# Patient Record
Sex: FEMALE | Race: ASIAN | URBAN - METROPOLITAN AREA
[De-identification: names, ages, dates, MRNs, and addresses within clinical notes are randomized per-mention and may not be internally consistent; named-entity substitution may affect disease eponyms.]

---

## 2022-10-20 ENCOUNTER — ULTRASOUND ENCOUNTER (OUTPATIENT)
Dept: OBGYN CLINIC | Facility: CLINIC | Age: 32
End: 2022-10-20
Payer: COMMERCIAL

## 2022-10-20 ENCOUNTER — OFFICE VISIT (OUTPATIENT)
Dept: OBGYN CLINIC | Facility: CLINIC | Age: 32
End: 2022-10-20
Payer: COMMERCIAL

## 2022-10-20 VITALS
DIASTOLIC BLOOD PRESSURE: 60 MMHG | SYSTOLIC BLOOD PRESSURE: 108 MMHG | BODY MASS INDEX: 20.85 KG/M2 | WEIGHT: 123.63 LBS | HEIGHT: 64.5 IN | HEART RATE: 71 BPM

## 2022-10-20 DIAGNOSIS — B19.10 HEPATITIS B AFFECTING PREGNANCY: ICD-10-CM

## 2022-10-20 DIAGNOSIS — O98.419 HEPATITIS B AFFECTING PREGNANCY: ICD-10-CM

## 2022-10-20 DIAGNOSIS — Z32.01 PREGNANCY EXAMINATION OR TEST, POSITIVE RESULT: ICD-10-CM

## 2022-10-20 DIAGNOSIS — Z23 NEED FOR VACCINATION: ICD-10-CM

## 2022-10-20 DIAGNOSIS — N91.1 SECONDARY AMENORRHEA: Primary | ICD-10-CM

## 2022-10-20 PROBLEM — Z98.891 HISTORY OF CESAREAN DELIVERY: Status: ACTIVE | Noted: 2022-10-20

## 2022-10-20 PROCEDURE — 99203 OFFICE O/P NEW LOW 30 MIN: CPT | Performed by: OBSTETRICS & GYNECOLOGY

## 2022-10-20 PROCEDURE — 3008F BODY MASS INDEX DOCD: CPT | Performed by: OBSTETRICS & GYNECOLOGY

## 2022-10-20 PROCEDURE — 3078F DIAST BP <80 MM HG: CPT | Performed by: OBSTETRICS & GYNECOLOGY

## 2022-10-20 PROCEDURE — 90471 IMMUNIZATION ADMIN: CPT | Performed by: OBSTETRICS & GYNECOLOGY

## 2022-10-20 PROCEDURE — 90686 IIV4 VACC NO PRSV 0.5 ML IM: CPT | Performed by: OBSTETRICS & GYNECOLOGY

## 2022-10-20 PROCEDURE — 76830 TRANSVAGINAL US NON-OB: CPT | Performed by: OBSTETRICS & GYNECOLOGY

## 2022-10-20 PROCEDURE — 3074F SYST BP LT 130 MM HG: CPT | Performed by: OBSTETRICS & GYNECOLOGY

## 2022-10-20 RX ORDER — MULTIVIT,IRON,MINERALS/LUTEIN
TABLET ORAL
COMMUNITY

## 2022-10-31 ENCOUNTER — TELEPHONE (OUTPATIENT)
Dept: OBGYN CLINIC | Facility: CLINIC | Age: 32
End: 2022-10-31

## 2022-10-31 DIAGNOSIS — Z13.228 SCREENING FOR CYSTIC FIBROSIS: ICD-10-CM

## 2022-10-31 DIAGNOSIS — Z36.8A ENCOUNTER FOR ANTENATAL SCREENING FOR OTHER GENETIC DEFECT: Primary | ICD-10-CM

## 2022-10-31 NOTE — TELEPHONE ENCOUNTER
Patient calling and states would like [id number], Alpha Fetoprotein, and Cystic Fibrosis/SMA Carrier screening. Orders placed; patient is aware AFP should be done around 16-20 weeks. Patient would like to  MaterniT 21 order at the [place] office.

## 2022-11-10 ENCOUNTER — LAB ENCOUNTER (OUTPATIENT)
Dept: LAB | Age: 32
End: 2022-11-10
Attending: OBSTETRICS & GYNECOLOGY
Payer: COMMERCIAL

## 2022-11-10 DIAGNOSIS — Z36.8A ENCOUNTER FOR ANTENATAL SCREENING FOR OTHER GENETIC DEFECT: Primary | ICD-10-CM

## 2022-11-14 ENCOUNTER — TELEPHONE (OUTPATIENT)
Dept: OBGYN CLINIC | Facility: CLINIC | Age: 32
End: 2022-11-14

## 2022-11-14 ENCOUNTER — LAB ENCOUNTER (OUTPATIENT)
Dept: LAB | Age: 32
End: 2022-11-14
Attending: NURSE PRACTITIONER
Payer: COMMERCIAL

## 2022-11-14 ENCOUNTER — INITIAL PRENATAL (OUTPATIENT)
Dept: OBGYN CLINIC | Facility: CLINIC | Age: 32
End: 2022-11-14
Payer: COMMERCIAL

## 2022-11-14 VITALS
WEIGHT: 123.38 LBS | DIASTOLIC BLOOD PRESSURE: 60 MMHG | HEIGHT: 64 IN | BODY MASS INDEX: 21.06 KG/M2 | SYSTOLIC BLOOD PRESSURE: 110 MMHG

## 2022-11-14 DIAGNOSIS — Z34.80 PRENATAL CARE OF MULTIGRAVIDA, ANTEPARTUM: ICD-10-CM

## 2022-11-14 DIAGNOSIS — B19.10 HEPATITIS B AFFECTING PREGNANCY: ICD-10-CM

## 2022-11-14 DIAGNOSIS — Z98.891 HISTORY OF CESAREAN DELIVERY: ICD-10-CM

## 2022-11-14 DIAGNOSIS — O98.419 HEPATITIS B AFFECTING PREGNANCY: ICD-10-CM

## 2022-11-14 DIAGNOSIS — Z12.4 CERVICAL CANCER SCREENING: ICD-10-CM

## 2022-11-14 DIAGNOSIS — Z36.8A ENCOUNTER FOR ANTENATAL SCREENING FOR OTHER GENETIC DEFECT: ICD-10-CM

## 2022-11-14 DIAGNOSIS — Z13.228 SCREENING FOR CYSTIC FIBROSIS: ICD-10-CM

## 2022-11-14 DIAGNOSIS — Z34.80 PRENATAL CARE OF MULTIGRAVIDA, ANTEPARTUM: Primary | ICD-10-CM

## 2022-11-14 LAB
ANTIBODY SCREEN: NEGATIVE
BASOPHILS # BLD AUTO: 0.03 X10(3) UL (ref 0–0.2)
BASOPHILS NFR BLD AUTO: 0.4 %
EOSINOPHIL # BLD AUTO: 0.09 X10(3) UL (ref 0–0.7)
EOSINOPHIL NFR BLD AUTO: 1.2 %
ERYTHROCYTE [DISTWIDTH] IN BLOOD BY AUTOMATED COUNT: 12.3 %
GLUCOSE (URINE DIPSTICK): 100 MG/DL
HBV SURFACE AG SER-ACNC: >1000 [IU]/L
HBV SURFACE AG SERPL QL IA: REACTIVE
HCT VFR BLD AUTO: 39.2 %
HCV AB SERPL QL IA: NONREACTIVE
HGB BLD-MCNC: 13.3 G/DL
IMM GRANULOCYTES # BLD AUTO: 0.02 X10(3) UL (ref 0–1)
IMM GRANULOCYTES NFR BLD: 0.3 %
LYMPHOCYTES # BLD AUTO: 1.98 X10(3) UL (ref 1–4)
LYMPHOCYTES NFR BLD AUTO: 25.8 %
MCH RBC QN AUTO: 31.1 PG (ref 26–34)
MCHC RBC AUTO-ENTMCNC: 33.9 G/DL (ref 31–37)
MCV RBC AUTO: 91.8 FL
MONOCYTES # BLD AUTO: 0.49 X10(3) UL (ref 0.1–1)
MONOCYTES NFR BLD AUTO: 6.4 %
MULTISTIX LOT#: ABNORMAL NUMERIC
NEUTROPHILS # BLD AUTO: 5.06 X10 (3) UL (ref 1.5–7.7)
NEUTROPHILS # BLD AUTO: 5.06 X10(3) UL (ref 1.5–7.7)
NEUTROPHILS NFR BLD AUTO: 65.9 %
PLATELET # BLD AUTO: 218 10(3)UL (ref 150–450)
RBC # BLD AUTO: 4.27 X10(6)UL
RH BLOOD TYPE: POSITIVE
RUBV IGG SER QL: POSITIVE
RUBV IGG SER-ACNC: 163.6 IU/ML (ref 10–?)
T PALLIDUM AB SER QL IA: NONREACTIVE
TSI SER-ACNC: 1.71 MIU/ML (ref 0.36–3.74)
WBC # BLD AUTO: 7.7 X10(3) UL (ref 4–11)

## 2022-11-14 PROCEDURE — 86901 BLOOD TYPING SEROLOGIC RH(D): CPT | Performed by: NURSE PRACTITIONER

## 2022-11-14 PROCEDURE — 87389 HIV-1 AG W/HIV-1&-2 AB AG IA: CPT | Performed by: NURSE PRACTITIONER

## 2022-11-14 PROCEDURE — 87340 HEPATITIS B SURFACE AG IA: CPT | Performed by: NURSE PRACTITIONER

## 2022-11-14 PROCEDURE — 80050 GENERAL HEALTH PANEL: CPT | Performed by: NURSE PRACTITIONER

## 2022-11-14 PROCEDURE — 86762 RUBELLA ANTIBODY: CPT | Performed by: NURSE PRACTITIONER

## 2022-11-14 PROCEDURE — 3008F BODY MASS INDEX DOCD: CPT | Performed by: NURSE PRACTITIONER

## 2022-11-14 PROCEDURE — 86803 HEPATITIS C AB TEST: CPT | Performed by: NURSE PRACTITIONER

## 2022-11-14 PROCEDURE — 86850 RBC ANTIBODY SCREEN: CPT | Performed by: NURSE PRACTITIONER

## 2022-11-14 PROCEDURE — 86900 BLOOD TYPING SEROLOGIC ABO: CPT | Performed by: NURSE PRACTITIONER

## 2022-11-14 PROCEDURE — 87591 N.GONORRHOEAE DNA AMP PROB: CPT | Performed by: NURSE PRACTITIONER

## 2022-11-14 PROCEDURE — 81002 URINALYSIS NONAUTO W/O SCOPE: CPT | Performed by: NURSE PRACTITIONER

## 2022-11-14 PROCEDURE — 81220 CFTR GENE COM VARIANTS: CPT | Performed by: OBSTETRICS & GYNECOLOGY

## 2022-11-14 PROCEDURE — 86780 TREPONEMA PALLIDUM: CPT | Performed by: NURSE PRACTITIONER

## 2022-11-14 PROCEDURE — 87086 URINE CULTURE/COLONY COUNT: CPT | Performed by: NURSE PRACTITIONER

## 2022-11-14 PROCEDURE — 81329 SMN1 GENE DOS/DELETION ALYS: CPT | Performed by: OBSTETRICS & GYNECOLOGY

## 2022-11-14 PROCEDURE — 87491 CHLMYD TRACH DNA AMP PROBE: CPT | Performed by: NURSE PRACTITIONER

## 2022-11-14 PROCEDURE — 3078F DIAST BP <80 MM HG: CPT | Performed by: NURSE PRACTITIONER

## 2022-11-14 PROCEDURE — 3074F SYST BP LT 130 MM HG: CPT | Performed by: NURSE PRACTITIONER

## 2022-11-14 PROCEDURE — 87624 HPV HI-RISK TYP POOLED RSLT: CPT | Performed by: NURSE PRACTITIONER

## 2022-11-14 RX ORDER — PRENATAL VIT 91/IRON/FOLIC/DHA 28-975-200
COMBINATION PACKAGE (EA) ORAL
Qty: 180 EACH | Refills: 2 | Status: SHIPPED | OUTPATIENT
Start: 2022-11-14

## 2022-11-14 NOTE — PROGRESS NOTES
Here for initial prenatal visit with our group. 29year old  at 11w4d by LMP. Patient's last menstrual period was 2022. Last pap smear was in 2019 and it was normal.    She had a primary C/S for arrest of descent. She prefers a repeat at this time. She was born with Hepatitis B, has not needed treatment in around 5 years. OB History    Para Term  AB Living   2 1 1   0 1   SAB IAB Ectopic Multiple Live Births   0       1      # Outcome Date GA Lbr Miguel/2nd Weight Sex Delivery Anes PTL Lv   2 Current            1 Term 20 40w0d   F Caesarean   MORENO     ROS:  Reviewed, all wnl    Please see prenatal physical exam tab for initial OB physical exam  US: please see US tab    Prenatal course and care discussed with patient: visits, labs, HIV, AFP, sonograms, GL, GBS, restrictions. Pamphlets given. Genetic counseling done at prior visit regarding Cystic fibrosis and SMA carrier screening, First trimester screen/NT screen, CVS, QS + midtrimester sonogram for markers, amniocentesis. Pt completed the MaterniT 21 last week. She plan to do SMA and CF carrier screen with her new OB labs. A/P:  1. Prenatal care of multigravida, antepartum  - URINALYSIS NONAUTO W/O SCOPE (OB URISTIX)  - CHLAMYDIA/GONOCOCCUS, KATHRINE; Future  - PRENATAL SCREEN; Future  - CBC WITH DIFFERENTIAL WITH PLATELET; Future  - HEPATITIS B SURFACE ANTIGEN; Future  - T PALLIDUM SCREENING CASCADE; Future  - RUBELLA, IGG; Future  - HIV AG AB COMBO; Future  - URINE CULTURE, ROUTINE; Future  - HCV ANTIBODY; Future  - Prenatal MV-Min-Fe Fum-FA-DHA (PRENATAL+DHA) 28-0.975 & 200 MG Oral Misc; 1 tablet and 1 capsule PO QD  Dispense: 180 each; Refill: 2  - CHLAMYDIA/GONOCOCCUS, KATHRINE  - URINE CULTURE, ROUTINE    2. Cervical cancer screening  - THINPREP PAP WITH HPV REFLEX REQUEST B; Future  - THINPREP PAP WITH HPV REFLEX REQUEST B    3. Hepatitis B affecting pregnancy  - OP REFERRAL TO  CONSULT- Level 2 US    4. History of  delivery  - OP REFERRAL TO  CONSULT  Considering RCS    ANTWAN 4 weeks/ prn

## 2022-11-15 ENCOUNTER — TELEPHONE (OUTPATIENT)
Dept: OBGYN CLINIC | Facility: CLINIC | Age: 32
End: 2022-11-15

## 2022-11-15 LAB
ALBUMIN SERPL-MCNC: 3.6 G/DL (ref 3.4–5)
ALBUMIN/GLOB SERPL: 1.1 {RATIO} (ref 1–2)
ALP LIVER SERPL-CCNC: 37 U/L
ALT SERPL-CCNC: 16 U/L
ANION GAP SERPL CALC-SCNC: 6 MMOL/L (ref 0–18)
AST SERPL-CCNC: 6 U/L (ref 15–37)
BILIRUB SERPL-MCNC: 0.2 MG/DL (ref 0.1–2)
BUN BLD-MCNC: 8 MG/DL (ref 7–18)
C TRACH DNA SPEC QL NAA+PROBE: NEGATIVE
CALCIUM BLD-MCNC: 8.8 MG/DL (ref 8.5–10.1)
CHLORIDE SERPL-SCNC: 107 MMOL/L (ref 98–112)
CO2 SERPL-SCNC: 23 MMOL/L (ref 21–32)
CREAT BLD-MCNC: 0.5 MG/DL
GFR SERPLBLD BASED ON 1.73 SQ M-ARVRAT: 129 ML/MIN/1.73M2 (ref 60–?)
GLOBULIN PLAS-MCNC: 3.3 G/DL (ref 2.8–4.4)
GLUCOSE BLD-MCNC: 85 MG/DL (ref 70–99)
N GONORRHOEA DNA SPEC QL NAA+PROBE: NEGATIVE
OSMOLALITY SERPL CALC.SUM OF ELEC: 280 MOSM/KG (ref 275–295)
POTASSIUM SERPL-SCNC: 4.3 MMOL/L (ref 3.5–5.1)
PROT SERPL-MCNC: 6.9 G/DL (ref 6.4–8.2)
SODIUM SERPL-SCNC: 136 MMOL/L (ref 136–145)

## 2022-11-15 NOTE — TELEPHONE ENCOUNTER
Pharmacy calling. The prenatal that was sent in is manufactured by [employer] and therefore not available at [facility]. They do have an alternative available with the same ingredients and almost the same dosages. OK to substitute the brand they have in stock.

## 2022-11-20 LAB
CYSTIC FIBROSIS ALLELE 1: NEGATIVE
CYSTIC FIBROSIS, ALLELE 2: NEGATIVE
SMA COPY NUMBER, SYMPTOMS: NO

## 2022-11-21 LAB — HPV I/H RISK 1 DNA SPEC QL NAA+PROBE: NEGATIVE

## 2022-11-29 ENCOUNTER — TELEPHONE (OUTPATIENT)
Dept: OBGYN CLINIC | Facility: CLINIC | Age: 32
End: 2022-11-29

## 2022-11-29 NOTE — TELEPHONE ENCOUNTER
Patient would like to speak with a nurse to go over [id number] test results.  Please give patient a call

## 2022-12-01 ENCOUNTER — LAB ENCOUNTER (OUTPATIENT)
Dept: LAB | Age: 32
End: 2022-12-01
Attending: OBSTETRICS & GYNECOLOGY
Payer: COMMERCIAL

## 2022-12-01 DIAGNOSIS — B18.1 HEPATITIS B CARRIER (HCC): Primary | ICD-10-CM

## 2022-12-01 LAB
ALBUMIN SERPL-MCNC: 3.4 G/DL (ref 3.4–5)
ALBUMIN/GLOB SERPL: 1.1 {RATIO} (ref 1–2)
ALP LIVER SERPL-CCNC: 39 U/L
ALT SERPL-CCNC: 16 U/L
ANION GAP SERPL CALC-SCNC: 6 MMOL/L (ref 0–18)
AST SERPL-CCNC: 10 U/L (ref 15–37)
BILIRUB SERPL-MCNC: 0.3 MG/DL (ref 0.1–2)
BUN BLD-MCNC: 9 MG/DL (ref 7–18)
BUN/CREAT SERPL: 17 (ref 10–20)
CALCIUM BLD-MCNC: 9.3 MG/DL (ref 8.5–10.1)
CHLORIDE SERPL-SCNC: 105 MMOL/L (ref 98–112)
CO2 SERPL-SCNC: 26 MMOL/L (ref 21–32)
CREAT BLD-MCNC: 0.53 MG/DL
FASTING STATUS PATIENT QL REPORTED: NO
GFR SERPLBLD BASED ON 1.73 SQ M-ARVRAT: 128 ML/MIN/1.73M2 (ref 60–?)
GLOBULIN PLAS-MCNC: 3.1 G/DL (ref 2.8–4.4)
GLUCOSE BLD-MCNC: 128 MG/DL (ref 70–99)
OSMOLALITY SERPL CALC.SUM OF ELEC: 284 MOSM/KG (ref 275–295)
POTASSIUM SERPL-SCNC: 3.7 MMOL/L (ref 3.5–5.1)
PROT SERPL-MCNC: 6.5 G/DL (ref 6.4–8.2)
SODIUM SERPL-SCNC: 137 MMOL/L (ref 136–145)

## 2022-12-01 PROCEDURE — 80053 COMPREHEN METABOLIC PANEL: CPT

## 2022-12-01 PROCEDURE — 87517 HEPATITIS B DNA QUANT: CPT

## 2022-12-07 LAB
HBV QNT BY NAAT (IU/ML): 180 IU/ML
HBV QNT BY NAAT (LOG IU/ML): 2.26 LOG IU/ML
HBV QNT BY NAAT INTERP: DETECTED

## 2022-12-13 ENCOUNTER — ROUTINE PRENATAL (OUTPATIENT)
Dept: OBGYN CLINIC | Facility: CLINIC | Age: 32
End: 2022-12-13
Payer: COMMERCIAL

## 2022-12-13 VITALS
BODY MASS INDEX: 21.4 KG/M2 | WEIGHT: 125.38 LBS | HEIGHT: 64 IN | SYSTOLIC BLOOD PRESSURE: 100 MMHG | DIASTOLIC BLOOD PRESSURE: 60 MMHG

## 2022-12-13 DIAGNOSIS — O98.419 HEPATITIS B AFFECTING PREGNANCY: ICD-10-CM

## 2022-12-13 DIAGNOSIS — Z34.80 PRENATAL CARE OF MULTIGRAVIDA, ANTEPARTUM: ICD-10-CM

## 2022-12-13 DIAGNOSIS — Z34.80 SUPERVISION OF OTHER NORMAL PREGNANCY, ANTEPARTUM: Primary | ICD-10-CM

## 2022-12-13 DIAGNOSIS — B19.10 HEPATITIS B AFFECTING PREGNANCY: ICD-10-CM

## 2022-12-13 DIAGNOSIS — Z98.891 HISTORY OF CESAREAN DELIVERY: ICD-10-CM

## 2022-12-13 LAB
GLUCOSE (URINE DIPSTICK): 250 MG/DL
MULTISTIX LOT#: ABNORMAL NUMERIC

## 2022-12-13 PROCEDURE — 81002 URINALYSIS NONAUTO W/O SCOPE: CPT | Performed by: OBSTETRICS & GYNECOLOGY

## 2022-12-13 PROCEDURE — 3078F DIAST BP <80 MM HG: CPT | Performed by: OBSTETRICS & GYNECOLOGY

## 2022-12-13 PROCEDURE — 3074F SYST BP LT 130 MM HG: CPT | Performed by: OBSTETRICS & GYNECOLOGY

## 2022-12-13 PROCEDURE — 3008F BODY MASS INDEX DOCD: CPT | Performed by: OBSTETRICS & GYNECOLOGY

## 2022-12-13 NOTE — PROGRESS NOTES
ANTWAN    GA 15w5d   22  1158   BP: 100/60   Weight: 125 lb 6.4 oz (56.9 kg)   Height: 64\"       Doing well. No complaints. Denies abdominal/pelvic pain or vaginal bleeding. Rh +   Genetic screening NIPT neg. Plans to complete MSAFP. Recommend Anatomy scan 20 wks by level 2 US  Prenatal labs reviewed   Glucosuria, patient had just eaten pizza before arrival. Recommend increase water. Recheck at next visit. May consider early 1 hour glucose tolerance screening if persistent. Discussed with patient. Patient reports coughing for about 1 minute when she enters the cold environment. Possibly occurred outside of pregnancy. Recommend to follow-up with PCP regarding possible bronchospasm. Patient also reports nailbed changes purple when exposed to cold temperature. Discussed with patient possible perfusion issue and not likely related to pregnancy. Recommend to follow-up with PCP. Problem List Items Addressed This Visit        Gastrointestinal and Abdominal    Hepatitis B affecting pregnancy       Gravid and     History of  delivery    Supervision of other normal pregnancy, antepartum - Primary   Other Visit Diagnoses     Prenatal care of multigravida, antepartum                RTC in 4 weeks           Note to patient and family   The [facility] makes medical notes available to patients in the interest of transparency. However, please be advised that this is a medical document. It is intended as ysth-bf-hbjm communication. It is written and medical language may contain abbreviations or verbiage that are technical and unfamiliar. It may appear blunt or direct. Medical documents are intended to carry relevant information, facts as evident, and the clinical opinion of the practitioner.

## 2022-12-14 ENCOUNTER — HOSPITAL ENCOUNTER (OUTPATIENT)
Dept: ULTRASOUND IMAGING | Age: 32
Discharge: HOME | End: 2022-12-14
Attending: INTERNAL MEDICINE
Payer: COMMERCIAL

## 2022-12-14 DIAGNOSIS — B18.1 HEPATITIS B CARRIER (HCC): ICD-10-CM

## 2022-12-14 PROCEDURE — 76705 ECHO EXAM OF ABDOMEN: CPT | Performed by: INTERNAL MEDICINE

## 2022-12-19 ENCOUNTER — LAB ENCOUNTER (OUTPATIENT)
Dept: LAB | Age: 32
End: 2022-12-19
Attending: OBSTETRICS & GYNECOLOGY
Payer: COMMERCIAL

## 2022-12-19 DIAGNOSIS — Z36.8A ENCOUNTER FOR ANTENATAL SCREENING FOR OTHER GENETIC DEFECT: ICD-10-CM

## 2022-12-19 PROCEDURE — 82105 ALPHA-FETOPROTEIN SERUM: CPT | Performed by: OBSTETRICS & GYNECOLOGY

## 2022-12-22 LAB
AFP SMOKING: NO
FAMILY HX NEURAL TUBE DEFECT: NO
INSULIN REQ MATERNAL DIABETES: NO
MATERNAL AGE OF DELIVERY: 31.3 YR
MOM FOR AFP: 1.11
PATIENT'S AFP: 47 NG/ML

## 2023-01-06 PROBLEM — E22.1 HYPERPROLACTINEMIA (HCC): Status: ACTIVE | Noted: 2019-05-08

## 2023-01-06 PROBLEM — B18.1: Status: ACTIVE | Noted: 2020-01-08

## 2023-01-09 ENCOUNTER — ROUTINE PRENATAL (OUTPATIENT)
Dept: OBGYN CLINIC | Facility: CLINIC | Age: 33
End: 2023-01-09
Payer: COMMERCIAL

## 2023-01-09 VITALS
WEIGHT: 126.19 LBS | SYSTOLIC BLOOD PRESSURE: 100 MMHG | HEIGHT: 64 IN | BODY MASS INDEX: 21.54 KG/M2 | DIASTOLIC BLOOD PRESSURE: 54 MMHG

## 2023-01-09 DIAGNOSIS — Z34.80 PRENATAL CARE OF MULTIGRAVIDA, ANTEPARTUM: Primary | ICD-10-CM

## 2023-01-09 LAB
GLUCOSE (URINE DIPSTICK): NEGATIVE MG/DL
MULTISTIX LOT#: NORMAL NUMERIC

## 2023-01-09 PROCEDURE — 81002 URINALYSIS NONAUTO W/O SCOPE: CPT | Performed by: OBSTETRICS & GYNECOLOGY

## 2023-01-09 PROCEDURE — 3074F SYST BP LT 130 MM HG: CPT | Performed by: OBSTETRICS & GYNECOLOGY

## 2023-01-09 PROCEDURE — 3078F DIAST BP <80 MM HG: CPT | Performed by: OBSTETRICS & GYNECOLOGY

## 2023-01-09 PROCEDURE — 3008F BODY MASS INDEX DOCD: CPT | Performed by: OBSTETRICS & GYNECOLOGY

## 2023-01-09 NOTE — PROGRESS NOTES
Patient presents with:  Prenatal Care: Quita Bartlett - wants to discuss c section scheduling. Wants to know what cough medications are safe during pregnancy. Routine prenatal visit. Patient without complaints. Good fetal movement. Patient denies any bleeding, leaking fluid, cramping, or contractions. Assessment/Plan:  19w4d doing well  Previous CS- plan RCS 39 wks. Sent request next visit. 1 hr GTT and CBC after next visit. Order not placed. Blood type A positive. Screening for fetal malformations- Level 2 scheduled for 1/16/23. Patient had NIPT, carrier and AFP. Diagnoses and all orders for this visit:    Prenatal care of multigravida, antepartum  -     URINALYSIS NONAUTO W/O SCOPE (OB URISTIX)      Return in about 4 weeks (around 2/6/2023) for Routine Prenatal Visit.

## 2023-01-16 ENCOUNTER — OFFICE VISIT (OUTPATIENT)
Dept: PERINATAL CARE | Facility: HOSPITAL | Age: 33
End: 2023-01-16
Attending: NURSE PRACTITIONER
Payer: COMMERCIAL

## 2023-01-16 ENCOUNTER — ULTRASOUND ENCOUNTER (OUTPATIENT)
Dept: PERINATAL CARE | Facility: HOSPITAL | Age: 33
End: 2023-01-16
Attending: OBSTETRICS & GYNECOLOGY
Payer: COMMERCIAL

## 2023-01-16 VITALS
WEIGHT: 127 LBS | HEIGHT: 64 IN | BODY MASS INDEX: 21.68 KG/M2 | HEART RATE: 98 BPM | SYSTOLIC BLOOD PRESSURE: 111 MMHG | DIASTOLIC BLOOD PRESSURE: 76 MMHG

## 2023-01-16 DIAGNOSIS — O98.419 HEPATITIS B AFFECTING PREGNANCY: ICD-10-CM

## 2023-01-16 DIAGNOSIS — Z98.891 HISTORY OF CESAREAN DELIVERY: ICD-10-CM

## 2023-01-16 DIAGNOSIS — O98.419 HEPATITIS B AFFECTING PREGNANCY: Primary | ICD-10-CM

## 2023-01-16 DIAGNOSIS — B19.10 HEPATITIS B AFFECTING PREGNANCY: Primary | ICD-10-CM

## 2023-01-16 DIAGNOSIS — B18.1 CHRONIC HEPATITIS B WITHOUT DELTA AGENT WITHOUT HEPATIC COMA (HCC): ICD-10-CM

## 2023-01-16 DIAGNOSIS — B19.10 HEPATITIS B AFFECTING PREGNANCY: ICD-10-CM

## 2023-01-16 PROCEDURE — 76811 OB US DETAILED SNGL FETUS: CPT | Performed by: OBSTETRICS & GYNECOLOGY

## 2023-02-03 ENCOUNTER — OFFICE VISIT (OUTPATIENT)
Dept: FAMILY MEDICINE CLINIC | Facility: CLINIC | Age: 33
End: 2023-02-03
Payer: COMMERCIAL

## 2023-02-03 VITALS
HEART RATE: 102 BPM | SYSTOLIC BLOOD PRESSURE: 110 MMHG | WEIGHT: 132 LBS | BODY MASS INDEX: 22.26 KG/M2 | TEMPERATURE: 98 F | OXYGEN SATURATION: 99 % | DIASTOLIC BLOOD PRESSURE: 62 MMHG | RESPIRATION RATE: 18 BRPM | HEIGHT: 64.75 IN

## 2023-02-03 DIAGNOSIS — Z00.00 ANNUAL PHYSICAL EXAM: Primary | ICD-10-CM

## 2023-02-03 DIAGNOSIS — B18.1 CHRONIC HEPATITIS B (HCC): ICD-10-CM

## 2023-02-03 PROCEDURE — 3074F SYST BP LT 130 MM HG: CPT | Performed by: STUDENT IN AN ORGANIZED HEALTH CARE EDUCATION/TRAINING PROGRAM

## 2023-02-03 PROCEDURE — 99385 PREV VISIT NEW AGE 18-39: CPT | Performed by: STUDENT IN AN ORGANIZED HEALTH CARE EDUCATION/TRAINING PROGRAM

## 2023-02-03 PROCEDURE — 3078F DIAST BP <80 MM HG: CPT | Performed by: STUDENT IN AN ORGANIZED HEALTH CARE EDUCATION/TRAINING PROGRAM

## 2023-02-03 PROCEDURE — 3008F BODY MASS INDEX DOCD: CPT | Performed by: STUDENT IN AN ORGANIZED HEALTH CARE EDUCATION/TRAINING PROGRAM

## 2023-02-06 ENCOUNTER — ROUTINE PRENATAL (OUTPATIENT)
Dept: OBGYN CLINIC | Facility: CLINIC | Age: 33
End: 2023-02-06
Payer: COMMERCIAL

## 2023-02-06 VITALS — BODY MASS INDEX: 22 KG/M2 | WEIGHT: 130.81 LBS | SYSTOLIC BLOOD PRESSURE: 104 MMHG | DIASTOLIC BLOOD PRESSURE: 50 MMHG

## 2023-02-06 DIAGNOSIS — N89.8 VAGINAL ITCHING: ICD-10-CM

## 2023-02-06 DIAGNOSIS — Z34.80 PRENATAL CARE OF MULTIGRAVIDA, ANTEPARTUM: ICD-10-CM

## 2023-02-06 LAB
GLUCOSE (URINE DIPSTICK): NEGATIVE MG/DL
MULTISTIX LOT#: NORMAL NUMERIC
PROTEIN (URINE DIPSTICK): NEGATIVE MG/DL

## 2023-02-06 PROCEDURE — 87660 TRICHOMONAS VAGIN DIR PROBE: CPT | Performed by: OBSTETRICS & GYNECOLOGY

## 2023-02-06 PROCEDURE — 87480 CANDIDA DNA DIR PROBE: CPT | Performed by: OBSTETRICS & GYNECOLOGY

## 2023-02-06 PROCEDURE — 87510 GARDNER VAG DNA DIR PROBE: CPT | Performed by: OBSTETRICS & GYNECOLOGY

## 2023-02-06 NOTE — PROGRESS NOTES
ANTWAN 23w4d    Doing ok, having vaginal itching, bought monistat but has not started yet,.white discharge noted, +FM  No contractions  No LOF, VB  Also feels a breast lump on left breast, last 4 days ago at night    Breast exam unremarkable    1. FHT-prsent  2. PNL:  1 hour/CBC ordered and instructions given  3. Mode of delivery: RCS request sent  4. Immunizations: reviewed TDAP  5. Yeast infection suspected, vaginitis colelcted, advised monistat  6.  Breast exam unremarkable, monitor    Return in 4 weeks

## 2023-02-07 ENCOUNTER — TELEPHONE (OUTPATIENT)
Dept: OBGYN CLINIC | Facility: CLINIC | Age: 33
End: 2023-02-07

## 2023-02-07 ENCOUNTER — PATIENT MESSAGE (OUTPATIENT)
Dept: OBGYN CLINIC | Facility: CLINIC | Age: 33
End: 2023-02-07

## 2023-02-07 DIAGNOSIS — Z01.812 ENCOUNTER FOR PREOPERATIVE SCREENING LABORATORY TESTING FOR COVID-19 VIRUS: Primary | ICD-10-CM

## 2023-02-07 DIAGNOSIS — Z20.822 ENCOUNTER FOR PREOPERATIVE SCREENING LABORATORY TESTING FOR COVID-19 VIRUS: Primary | ICD-10-CM

## 2023-02-07 NOTE — TELEPHONE ENCOUNTER
----- Message from Sammy Matthews MD sent at 2/6/2023 10:13 AM CST -----  Schedule RCS at 39 weeks thanks

## 2023-02-07 NOTE — TELEPHONE ENCOUNTER
Patient called and wants pharmacy updated and medication sent to [facility].  Patient also wants to talk to a nurse about a test. Please advise

## 2023-02-07 NOTE — TELEPHONE ENCOUNTER
RCS scheduled for 5/25/23 at 730  Form faxed to L&D  Added to calendar  Forward to RN  Sent [facility] to pt

## 2023-02-09 RX ORDER — CLINDAMYCIN PHOSPHATE 20 MG/G
1 CREAM VAGINAL NIGHTLY
Qty: 40 G | Refills: 0 | Status: SHIPPED | OUTPATIENT
Start: 2023-02-09 | End: 2023-02-16

## 2023-02-14 ENCOUNTER — LAB ENCOUNTER (OUTPATIENT)
Dept: LAB | Age: 33
End: 2023-02-14
Attending: OBSTETRICS & GYNECOLOGY
Payer: COMMERCIAL

## 2023-02-14 ENCOUNTER — PATIENT MESSAGE (OUTPATIENT)
Dept: OBGYN CLINIC | Facility: CLINIC | Age: 33
End: 2023-02-14

## 2023-02-14 DIAGNOSIS — O99.810 ABNORMAL MATERNAL GLUCOSE TOLERANCE, ANTEPARTUM: Primary | ICD-10-CM

## 2023-02-14 DIAGNOSIS — Z34.80 PRENATAL CARE OF MULTIGRAVIDA, ANTEPARTUM: ICD-10-CM

## 2023-02-14 LAB
BASOPHILS # BLD AUTO: 0.03 X10(3) UL (ref 0–0.2)
BASOPHILS NFR BLD AUTO: 0.4 %
EOSINOPHIL # BLD AUTO: 0.03 X10(3) UL (ref 0–0.7)
EOSINOPHIL NFR BLD AUTO: 0.4 %
ERYTHROCYTE [DISTWIDTH] IN BLOOD BY AUTOMATED COUNT: 12.8 %
GLUCOSE 1H P GLC SERPL-MCNC: 162 MG/DL
HCT VFR BLD AUTO: 34.7 %
HGB BLD-MCNC: 11.9 G/DL
IMM GRANULOCYTES # BLD AUTO: 0.03 X10(3) UL (ref 0–1)
IMM GRANULOCYTES NFR BLD: 0.4 %
LYMPHOCYTES # BLD AUTO: 1.54 X10(3) UL (ref 1–4)
LYMPHOCYTES NFR BLD AUTO: 18.5 %
MCH RBC QN AUTO: 32.6 PG (ref 26–34)
MCHC RBC AUTO-ENTMCNC: 34.3 G/DL (ref 31–37)
MCV RBC AUTO: 95.1 FL
MONOCYTES # BLD AUTO: 0.37 X10(3) UL (ref 0.1–1)
MONOCYTES NFR BLD AUTO: 4.5 %
NEUTROPHILS # BLD AUTO: 6.31 X10 (3) UL (ref 1.5–7.7)
NEUTROPHILS # BLD AUTO: 6.31 X10(3) UL (ref 1.5–7.7)
NEUTROPHILS NFR BLD AUTO: 75.8 %
PLATELET # BLD AUTO: 176 10(3)UL (ref 150–450)
RBC # BLD AUTO: 3.65 X10(6)UL
WBC # BLD AUTO: 8.3 X10(3) UL (ref 4–11)

## 2023-02-14 PROCEDURE — 36415 COLL VENOUS BLD VENIPUNCTURE: CPT

## 2023-02-14 PROCEDURE — 85025 COMPLETE CBC W/AUTO DIFF WBC: CPT

## 2023-02-14 PROCEDURE — 82950 GLUCOSE TEST: CPT

## 2023-02-15 NOTE — TELEPHONE ENCOUNTER
From: Deanna Valentine  To: Zoila Almonte MD  Sent: 2/14/2023 9:39 AM CST  Subject: Nicola Dawson    Dear Dr. Johana Verma,    I am writing to ask when I can do the CBC Reflex to Ferritin test. Today I am 24 weeks + 5 days. Can I do this test today? Thank you for your reply.     Best wishes,  Michelle Wall

## 2023-02-15 NOTE — PROGRESS NOTES
Patient returned call. Reported results and recommendations. Instructions given to patient for gtt. Questions answered and pt states understanding.

## 2023-03-01 ENCOUNTER — LABORATORY ENCOUNTER (OUTPATIENT)
Dept: LAB | Age: 33
End: 2023-03-01
Attending: OBSTETRICS & GYNECOLOGY
Payer: COMMERCIAL

## 2023-03-01 DIAGNOSIS — O99.810 ABNORMAL MATERNAL GLUCOSE TOLERANCE, ANTEPARTUM: ICD-10-CM

## 2023-03-01 LAB
GLUCOSE 1H P GLC SERPL-MCNC: 132 MG/DL
GLUCOSE 2H P GLC SERPL-MCNC: 160 MG/DL
GLUCOSE 3H P GLC SERPL-MCNC: 179 MG/DL (ref 70–140)
GLUCOSE P FAST SERPL-MCNC: 85 MG/DL

## 2023-03-01 PROCEDURE — 36415 COLL VENOUS BLD VENIPUNCTURE: CPT

## 2023-03-01 PROCEDURE — 82951 GLUCOSE TOLERANCE TEST (GTT): CPT

## 2023-03-01 PROCEDURE — 82952 GTT-ADDED SAMPLES: CPT

## 2023-03-03 PROBLEM — O24.419 GESTATIONAL DIABETES MELLITUS (GDM), ANTEPARTUM (HCC): Status: ACTIVE | Noted: 2023-03-03

## 2023-03-03 PROBLEM — O24.419 GESTATIONAL DIABETES MELLITUS (GDM), ANTEPARTUM: Status: ACTIVE | Noted: 2023-03-03

## 2023-03-03 NOTE — PROGRESS NOTES
Please let patient know her 3 hour is abnormal. She has GDM. Please rfer her to GDM education.  Thank you

## 2023-03-06 ENCOUNTER — TELEPHONE (OUTPATIENT)
Dept: OBGYN CLINIC | Facility: CLINIC | Age: 33
End: 2023-03-06

## 2023-03-06 DIAGNOSIS — O24.419 GESTATIONAL DIABETES MELLITUS (GDM) IN THIRD TRIMESTER, GESTATIONAL DIABETES METHOD OF CONTROL UNSPECIFIED: Primary | ICD-10-CM

## 2023-03-06 NOTE — TELEPHONE ENCOUNTER
----- Message from Lion Richardson MD sent at 3/3/2023  3:35 PM CST -----  Regarding: call patient back  Pt called back.  Please call her back to help her with GDM education etc thank you

## 2023-03-06 NOTE — TELEPHONE ENCOUNTER
Called patient; no answer. Left a message to call back. Order placed for GDM education. To schedule your appointment at either our [facility] or [place] location call [phone].

## 2023-03-06 NOTE — TELEPHONE ENCOUNTER
Patient wanted to know if she needed to bring anything to her appointment with the diabetic educator. She was notified that she doesn't need anything  They will teach her how to check her blood sugars, order supplies and make sure she brings a log of her numbers to her ANTWAN appointments or upload them via [employer],  Patient verbalizes understanding.

## 2023-03-07 ENCOUNTER — NURSE ONLY (OUTPATIENT)
Dept: ENDOCRINOLOGY CLINIC | Facility: CLINIC | Age: 33
End: 2023-03-07
Payer: COMMERCIAL

## 2023-03-07 ENCOUNTER — TELEPHONE (OUTPATIENT)
Dept: ENDOCRINOLOGY CLINIC | Facility: CLINIC | Age: 33
End: 2023-03-07

## 2023-03-07 ENCOUNTER — LAB ENCOUNTER (OUTPATIENT)
Dept: LAB | Age: 33
End: 2023-03-07
Attending: OBSTETRICS & GYNECOLOGY
Payer: COMMERCIAL

## 2023-03-07 ENCOUNTER — ROUTINE PRENATAL (OUTPATIENT)
Dept: OBGYN CLINIC | Facility: CLINIC | Age: 33
End: 2023-03-07
Payer: COMMERCIAL

## 2023-03-07 VITALS — BODY MASS INDEX: 23 KG/M2 | WEIGHT: 132.38 LBS

## 2023-03-07 VITALS
SYSTOLIC BLOOD PRESSURE: 110 MMHG | WEIGHT: 133 LBS | HEIGHT: 64 IN | BODY MASS INDEX: 22.71 KG/M2 | DIASTOLIC BLOOD PRESSURE: 68 MMHG | HEART RATE: 92 BPM

## 2023-03-07 DIAGNOSIS — Z3A.27 27 WEEKS GESTATION OF PREGNANCY: ICD-10-CM

## 2023-03-07 DIAGNOSIS — B19.10 HEPATITIS B AFFECTING PREGNANCY: ICD-10-CM

## 2023-03-07 DIAGNOSIS — O98.419 HEPATITIS B AFFECTING PREGNANCY: ICD-10-CM

## 2023-03-07 DIAGNOSIS — B18.1 HEPATITIS B CARRIER (HCC): ICD-10-CM

## 2023-03-07 DIAGNOSIS — Z23 NEED FOR VACCINATION: ICD-10-CM

## 2023-03-07 DIAGNOSIS — O24.410 DIET CONTROLLED GESTATIONAL DIABETES MELLITUS (GDM), ANTEPARTUM: Primary | ICD-10-CM

## 2023-03-07 DIAGNOSIS — Z3A.27 27 WEEKS GESTATION OF PREGNANCY: Primary | ICD-10-CM

## 2023-03-07 LAB
ALBUMIN SERPL-MCNC: 2.9 G/DL (ref 3.4–5)
ALBUMIN/GLOB SERPL: 0.7 {RATIO} (ref 1–2)
ALP LIVER SERPL-CCNC: 61 U/L
ALT SERPL-CCNC: 20 U/L
ANION GAP SERPL CALC-SCNC: 5 MMOL/L (ref 0–18)
AST SERPL-CCNC: 14 U/L (ref 15–37)
BILIRUB DIRECT SERPL-MCNC: <0.1 MG/DL (ref 0–0.2)
BILIRUB SERPL-MCNC: 0.2 MG/DL (ref 0.1–2)
BUN BLD-MCNC: 9 MG/DL (ref 7–18)
CALCIUM BLD-MCNC: 9.2 MG/DL (ref 8.5–10.1)
CHLORIDE SERPL-SCNC: 103 MMOL/L (ref 98–112)
CO2 SERPL-SCNC: 27 MMOL/L (ref 21–32)
CREAT BLD-MCNC: 0.47 MG/DL
FASTING STATUS PATIENT QL REPORTED: NO
GFR SERPLBLD BASED ON 1.73 SQ M-ARVRAT: 130 ML/MIN/1.73M2 (ref 60–?)
GLOBULIN PLAS-MCNC: 3.9 G/DL (ref 2.8–4.4)
GLUCOSE (URINE DIPSTICK): NEGATIVE MG/DL
GLUCOSE BLD-MCNC: 84 MG/DL (ref 70–99)
MULTISTIX LOT#: NORMAL NUMERIC
OSMOLALITY SERPL CALC.SUM OF ELEC: 278 MOSM/KG (ref 275–295)
POTASSIUM SERPL-SCNC: 4.1 MMOL/L (ref 3.5–5.1)
PROT SERPL-MCNC: 6.8 G/DL (ref 6.4–8.2)
PROTEIN (URINE DIPSTICK): NEGATIVE MG/DL
SODIUM SERPL-SCNC: 135 MMOL/L (ref 136–145)

## 2023-03-07 PROCEDURE — 3074F SYST BP LT 130 MM HG: CPT | Performed by: OBSTETRICS & GYNECOLOGY

## 2023-03-07 PROCEDURE — 80053 COMPREHEN METABOLIC PANEL: CPT

## 2023-03-07 PROCEDURE — 3008F BODY MASS INDEX DOCD: CPT | Performed by: OBSTETRICS & GYNECOLOGY

## 2023-03-07 PROCEDURE — 3078F DIAST BP <80 MM HG: CPT | Performed by: OBSTETRICS & GYNECOLOGY

## 2023-03-07 PROCEDURE — 82248 BILIRUBIN DIRECT: CPT

## 2023-03-07 PROCEDURE — 90715 TDAP VACCINE 7 YRS/> IM: CPT | Performed by: OBSTETRICS & GYNECOLOGY

## 2023-03-07 PROCEDURE — 87389 HIV-1 AG W/HIV-1&-2 AB AG IA: CPT

## 2023-03-07 PROCEDURE — G0108 DIAB MANAGE TRN  PER INDIV: HCPCS | Performed by: DIETITIAN, REGISTERED

## 2023-03-07 PROCEDURE — 36415 COLL VENOUS BLD VENIPUNCTURE: CPT

## 2023-03-07 PROCEDURE — 81002 URINALYSIS NONAUTO W/O SCOPE: CPT | Performed by: OBSTETRICS & GYNECOLOGY

## 2023-03-07 PROCEDURE — 90471 IMMUNIZATION ADMIN: CPT | Performed by: OBSTETRICS & GYNECOLOGY

## 2023-03-07 PROCEDURE — 87517 HEPATITIS B DNA QUANT: CPT

## 2023-03-07 RX ORDER — BLOOD SUGAR DIAGNOSTIC
STRIP MISCELLANEOUS
Qty: 400 STRIP | Refills: 1 | Status: SHIPPED | OUTPATIENT
Start: 2023-03-07 | End: 2023-03-08

## 2023-03-07 NOTE — PROGRESS NOTES
27w5d seen for routine OB visit. Denies ctx, lof, vb. Reports good FM. Accompanied by . Patient Active Problem List:     Hepatitis B affecting pregnancy     History of  delivery     Supervision of other normal pregnancy, antepartum     Chronic hepatitis B without delta agent without hepatic coma (HCC)     Hyperprolactinemia (HCC)     Abnormal maternal glucose tolerance, antepartum     Gestational diabetes mellitus (GDM), antepartum       Gen: AAOx3, NAD  Resp: breathing comfortably  Abdomen: gravid, soft, nontender  Ext: nontender, no edema    Plan:  - GDM - meeting with diabetic educator today  - MFM f/u   - HIV, LFTs, HBV ordered  - tdap today   - EPDS 0  - return precautions reviewed    RTO in 2 week(s).

## 2023-03-08 ENCOUNTER — TELEPHONE (OUTPATIENT)
Dept: ENDOCRINOLOGY CLINIC | Facility: CLINIC | Age: 33
End: 2023-03-08

## 2023-03-08 DIAGNOSIS — O24.410 DIET CONTROLLED GESTATIONAL DIABETES MELLITUS (GDM), ANTEPARTUM: ICD-10-CM

## 2023-03-08 RX ORDER — LANCETS 30 GAUGE
1 EACH MISCELLANEOUS 4 TIMES DAILY
Qty: 400 EACH | Refills: 1 | Status: SHIPPED | OUTPATIENT
Start: 2023-03-08

## 2023-03-08 RX ORDER — BLOOD SUGAR DIAGNOSTIC
STRIP MISCELLANEOUS
Qty: 400 STRIP | Refills: 1 | Status: SHIPPED | OUTPATIENT
Start: 2023-03-08 | End: 2024-03-07

## 2023-03-09 LAB
HBV QNT BY NAAT (LOG IU/ML): <1 LOG IU/ML
HBV QNT BY NAAT INTERP: DETECTED

## 2023-03-13 NOTE — PROGRESS NOTES
Contacted patient results given. Patient verbalized understanding and willingness to comply. No further questions.

## 2023-03-21 ENCOUNTER — NURSE ONLY (OUTPATIENT)
Dept: ENDOCRINOLOGY CLINIC | Facility: CLINIC | Age: 33
End: 2023-03-21
Payer: COMMERCIAL

## 2023-03-21 DIAGNOSIS — O24.419 GESTATIONAL DIABETES MELLITUS (GDM), ANTEPARTUM, GESTATIONAL DIABETES METHOD OF CONTROL UNSPECIFIED: ICD-10-CM

## 2023-03-21 PROCEDURE — G0108 DIAB MANAGE TRN  PER INDIV: HCPCS | Performed by: DIETITIAN, REGISTERED

## 2023-03-22 ENCOUNTER — ROUTINE PRENATAL (OUTPATIENT)
Dept: OBGYN CLINIC | Facility: CLINIC | Age: 33
End: 2023-03-22
Payer: COMMERCIAL

## 2023-03-22 VITALS — WEIGHT: 133.19 LBS | BODY MASS INDEX: 23 KG/M2

## 2023-03-22 VITALS — WEIGHT: 134 LBS | DIASTOLIC BLOOD PRESSURE: 62 MMHG | BODY MASS INDEX: 23 KG/M2 | SYSTOLIC BLOOD PRESSURE: 100 MMHG

## 2023-03-22 DIAGNOSIS — Z98.891 HISTORY OF CESAREAN DELIVERY: ICD-10-CM

## 2023-03-22 DIAGNOSIS — B19.10 HEPATITIS B AFFECTING PREGNANCY: ICD-10-CM

## 2023-03-22 DIAGNOSIS — O98.419 HEPATITIS B AFFECTING PREGNANCY: ICD-10-CM

## 2023-03-22 DIAGNOSIS — O24.419 GESTATIONAL DIABETES MELLITUS (GDM), ANTEPARTUM, GESTATIONAL DIABETES METHOD OF CONTROL UNSPECIFIED: Primary | ICD-10-CM

## 2023-03-22 LAB
GLUCOSE (URINE DIPSTICK): NEGATIVE MG/DL
MULTISTIX LOT#: NORMAL NUMERIC

## 2023-03-22 PROCEDURE — 3074F SYST BP LT 130 MM HG: CPT | Performed by: OBSTETRICS & GYNECOLOGY

## 2023-03-22 PROCEDURE — 81002 URINALYSIS NONAUTO W/O SCOPE: CPT | Performed by: OBSTETRICS & GYNECOLOGY

## 2023-03-22 PROCEDURE — 3078F DIAST BP <80 MM HG: CPT | Performed by: OBSTETRICS & GYNECOLOGY

## 2023-03-22 NOTE — PROGRESS NOTES
ANTWAN 29w5d    Doing ok, +FM  No regular contractions  no LOF, VB  Has some white discharge but no other symptoms    Pt brought sugars today. Approximately [place] 2/14 elevated, PP 4/28 elevated and noted to be with rice    1. FHT-present  2. PNL:  HIV is negative  3. Mode of delivery: RCS Is scheduled--reviewed I am unsure who delivering physician is at this time, can check with subsequent visits  4. GDMA1: sugars reviewed and discussed extensively today. For now I would suggest that she send her sugars in 1 week via [employer] for an update and that if intermittent elevations continue we may consider starting medications. We reviewed that even if sugars are mildly elevated, we use the cut offs of 120 and 95 for a reason--she will send next week, aware it will be sent to another provider as I am off next week  5. Chronic hep B:  Labs unremarkable 3/7/2023, need repeat q 2 months (May)  6. Immunizations: s/p TDAP  7.  Has f/u with MFM on 4/6    Return in 2 weeks

## 2023-03-27 ENCOUNTER — OFFICE VISIT (OUTPATIENT)
Dept: SURGERY | Facility: CLINIC | Age: 33
End: 2023-03-27

## 2023-03-27 VITALS
OXYGEN SATURATION: 100 % | WEIGHT: 129.38 LBS | HEART RATE: 77 BPM | SYSTOLIC BLOOD PRESSURE: 101 MMHG | BODY MASS INDEX: 22.09 KG/M2 | DIASTOLIC BLOOD PRESSURE: 66 MMHG | HEIGHT: 64 IN | TEMPERATURE: 97 F | RESPIRATION RATE: 16 BRPM

## 2023-03-27 DIAGNOSIS — B19.10 HEPATITIS B AFFECTING PREGNANCY: Primary | ICD-10-CM

## 2023-03-27 DIAGNOSIS — O98.419 HEPATITIS B AFFECTING PREGNANCY: Primary | ICD-10-CM

## 2023-04-05 PROBLEM — O24.410 DIET CONTROLLED GESTATIONAL DIABETES MELLITUS (GDM) IN THIRD TRIMESTER: Status: ACTIVE | Noted: 2023-03-03

## 2023-04-05 PROBLEM — O24.410 DIET CONTROLLED GESTATIONAL DIABETES MELLITUS (GDM) IN THIRD TRIMESTER (HCC): Status: ACTIVE | Noted: 2023-03-03

## 2023-04-05 PROBLEM — O99.810 ABNORMAL MATERNAL GLUCOSE TOLERANCE, ANTEPARTUM: Status: RESOLVED | Noted: 2023-02-14 | Resolved: 2023-04-05

## 2023-04-05 PROBLEM — O99.810 ABNORMAL MATERNAL GLUCOSE TOLERANCE, ANTEPARTUM (HCC): Status: RESOLVED | Noted: 2023-02-14 | Resolved: 2023-04-05

## 2023-04-06 ENCOUNTER — ULTRASOUND ENCOUNTER (OUTPATIENT)
Dept: PERINATAL CARE | Facility: HOSPITAL | Age: 33
End: 2023-04-06
Attending: OBSTETRICS & GYNECOLOGY
Payer: COMMERCIAL

## 2023-04-06 ENCOUNTER — ROUTINE PRENATAL (OUTPATIENT)
Dept: OBGYN CLINIC | Facility: CLINIC | Age: 33
End: 2023-04-06
Payer: COMMERCIAL

## 2023-04-06 VITALS
HEART RATE: 81 BPM | WEIGHT: 131.5 LBS | HEIGHT: 64 IN | DIASTOLIC BLOOD PRESSURE: 68 MMHG | BODY MASS INDEX: 22.45 KG/M2 | SYSTOLIC BLOOD PRESSURE: 108 MMHG

## 2023-04-06 VITALS
BODY MASS INDEX: 22.53 KG/M2 | DIASTOLIC BLOOD PRESSURE: 67 MMHG | WEIGHT: 132 LBS | HEIGHT: 64 IN | SYSTOLIC BLOOD PRESSURE: 108 MMHG | HEART RATE: 84 BPM

## 2023-04-06 DIAGNOSIS — B19.10 HEPATITIS B AFFECTING PREGNANCY: ICD-10-CM

## 2023-04-06 DIAGNOSIS — O98.419 HEPATITIS B AFFECTING PREGNANCY: Primary | ICD-10-CM

## 2023-04-06 DIAGNOSIS — O98.419 HEPATITIS B AFFECTING PREGNANCY: ICD-10-CM

## 2023-04-06 DIAGNOSIS — O26.13 POOR WEIGHT GAIN OF PREGNANCY, THIRD TRIMESTER: ICD-10-CM

## 2023-04-06 DIAGNOSIS — Z3A.32 32 WEEKS GESTATION OF PREGNANCY: Primary | ICD-10-CM

## 2023-04-06 DIAGNOSIS — B19.10 HEPATITIS B AFFECTING PREGNANCY: Primary | ICD-10-CM

## 2023-04-06 DIAGNOSIS — O24.410 DIET CONTROLLED GESTATIONAL DIABETES MELLITUS (GDM) IN THIRD TRIMESTER: ICD-10-CM

## 2023-04-06 LAB
GLUCOSE (URINE DIPSTICK): NEGATIVE MG/DL
MULTISTIX LOT#: NORMAL NUMERIC

## 2023-04-06 PROCEDURE — 3074F SYST BP LT 130 MM HG: CPT | Performed by: OBSTETRICS & GYNECOLOGY

## 2023-04-06 PROCEDURE — 81002 URINALYSIS NONAUTO W/O SCOPE: CPT | Performed by: OBSTETRICS & GYNECOLOGY

## 2023-04-06 PROCEDURE — 3078F DIAST BP <80 MM HG: CPT | Performed by: OBSTETRICS & GYNECOLOGY

## 2023-04-06 PROCEDURE — 3008F BODY MASS INDEX DOCD: CPT | Performed by: OBSTETRICS & GYNECOLOGY

## 2023-04-06 PROCEDURE — 76819 FETAL BIOPHYS PROFIL W/O NST: CPT

## 2023-04-06 PROCEDURE — 76816 OB US FOLLOW-UP PER FETUS: CPT | Performed by: OBSTETRICS & GYNECOLOGY

## 2023-04-06 NOTE — PROGRESS NOTES
Patient presents with:  Prenatal Care: ANTWAN--Pt. Would like to talk about glucose level and why her weight hasn't increased much    Routine prenatal visit without complaints. Patient denies any bleeding, leaking fluid, cramping, or regular uterine contractions. Good fetal movement. Assessment/Plan:  32w0d doing well  GDM- some of PP BS's slightly elevated. Will continue to monitor. Low weight gain- recommend increase caloric intake. Offered referral back to DM educator for guidance regarding weight gain and BS's. Check growth with [address] MFM today. Plan RCS  Kick counts reminded  Reviewed  labor signs and symptoms. Reviewed vaccine recommendations: Tdap done. Diagnoses and all orders for this visit:    32 weeks gestation of pregnancy  -     URINALYSIS NONAUTO W/O SCOPE (OB URISTIX)    Poor weight gain of pregnancy, third trimester       Return in about 2 weeks (around 2023) for Routine Prenatal Visit.

## 2023-04-06 NOTE — PROGRESS NOTES
Pt here for Growth Ultrasound  +fm noted per patient  Pt denies complaints   Pt GDM--diet controlled--followed by OB

## 2023-04-12 ENCOUNTER — MED REC SCAN ONLY (OUTPATIENT)
Dept: FAMILY MEDICINE CLINIC | Facility: CLINIC | Age: 33
End: 2023-04-12

## 2023-04-18 DIAGNOSIS — O24.410 DIET CONTROLLED GESTATIONAL DIABETES MELLITUS (GDM), ANTEPARTUM: ICD-10-CM

## 2023-04-18 RX ORDER — BLOOD SUGAR DIAGNOSTIC
STRIP MISCELLANEOUS
Qty: 400 STRIP | Refills: 1 | OUTPATIENT
Start: 2023-04-18 | End: 2024-04-17

## 2023-04-18 NOTE — TELEPHONE ENCOUNTER
Received refill for glucose test strips  She received enough refills for 6 months  Refill denied  Pharmacy notified

## 2023-04-18 NOTE — PROGRESS NOTES
ANTWAN 33w5d    Doing well, +FM  No regular contractions  no LOF, VB  Pt has been sending glucose in via mychart  Occasional PP have been elevated. Sugars reviewed and in mychart and overall good control. She has occasional elevations that are explained (see results)  Feels some ringing in her ear over the past few weeks, will last several minutes and then go away. Ears: clear bilaterally  FHT: on auscultation there is an audible arrhythmia noted    1. FHT-present  2. PNL:  HIV neg  3. Mode of delivery: RCS scheduled for 39 weeks  4. Immunizations: s/p TDAP  5. GDMA1: weekly NSTs recommended by MFM. Overall good control. There is no pattern of elevation. For now would continue diet control  6.  Fetal arrhythmia, new onset: NST done and still noted, but otherwise reactive and no decels/ D/w MFM, plan weekly NSTs now until delivery and she should return at 36 weeks for f/u growth US with them    Return in 1 weeks

## 2023-04-19 ENCOUNTER — APPOINTMENT (OUTPATIENT)
Dept: OBGYN CLINIC | Facility: CLINIC | Age: 33
End: 2023-04-19
Payer: COMMERCIAL

## 2023-04-19 ENCOUNTER — TELEPHONE (OUTPATIENT)
Dept: PERINATAL CARE | Facility: HOSPITAL | Age: 33
End: 2023-04-19

## 2023-04-19 ENCOUNTER — ROUTINE PRENATAL (OUTPATIENT)
Dept: OBGYN CLINIC | Facility: CLINIC | Age: 33
End: 2023-04-19
Payer: COMMERCIAL

## 2023-04-19 VITALS
DIASTOLIC BLOOD PRESSURE: 52 MMHG | SYSTOLIC BLOOD PRESSURE: 92 MMHG | HEART RATE: 90 BPM | BODY MASS INDEX: 23 KG/M2 | WEIGHT: 134 LBS

## 2023-04-19 DIAGNOSIS — Z98.891 HISTORY OF CESAREAN DELIVERY: ICD-10-CM

## 2023-04-19 DIAGNOSIS — O24.410 DIET CONTROLLED GESTATIONAL DIABETES MELLITUS (GDM) IN THIRD TRIMESTER: Primary | ICD-10-CM

## 2023-04-19 DIAGNOSIS — B18.1 CHRONIC HEPATITIS B WITHOUT DELTA AGENT WITHOUT HEPATIC COMA (HCC): ICD-10-CM

## 2023-04-19 DIAGNOSIS — O36.8390 FETAL ARRHYTHMIA AFFECTING PREGNANCY, ANTEPARTUM: ICD-10-CM

## 2023-04-19 DIAGNOSIS — Z34.80 PRENATAL CARE OF MULTIGRAVIDA, ANTEPARTUM: ICD-10-CM

## 2023-04-19 PROCEDURE — 3074F SYST BP LT 130 MM HG: CPT | Performed by: OBSTETRICS & GYNECOLOGY

## 2023-04-19 PROCEDURE — 81002 URINALYSIS NONAUTO W/O SCOPE: CPT | Performed by: OBSTETRICS & GYNECOLOGY

## 2023-04-19 PROCEDURE — 59025 FETAL NON-STRESS TEST: CPT | Performed by: OBSTETRICS & GYNECOLOGY

## 2023-04-19 PROCEDURE — 3078F DIAST BP <80 MM HG: CPT | Performed by: OBSTETRICS & GYNECOLOGY

## 2023-04-19 NOTE — PATIENT INSTRUCTIONS
Call [facility] for another growth US at 36 weeks due to the irregular fetal heart rate [phone]

## 2023-04-26 ENCOUNTER — APPOINTMENT (OUTPATIENT)
Dept: OBGYN CLINIC | Facility: CLINIC | Age: 33
End: 2023-04-26
Payer: COMMERCIAL

## 2023-04-26 ENCOUNTER — ROUTINE PRENATAL (OUTPATIENT)
Dept: OBGYN CLINIC | Facility: CLINIC | Age: 33
End: 2023-04-26
Payer: COMMERCIAL

## 2023-04-26 ENCOUNTER — PATIENT MESSAGE (OUTPATIENT)
Dept: OBGYN CLINIC | Facility: CLINIC | Age: 33
End: 2023-04-26

## 2023-04-26 VITALS
SYSTOLIC BLOOD PRESSURE: 100 MMHG | BODY MASS INDEX: 23.05 KG/M2 | HEIGHT: 64 IN | HEART RATE: 79 BPM | WEIGHT: 135 LBS | DIASTOLIC BLOOD PRESSURE: 62 MMHG

## 2023-04-26 DIAGNOSIS — O26.13 LOW WEIGHT GAIN DURING PREGNANCY IN THIRD TRIMESTER: ICD-10-CM

## 2023-04-26 DIAGNOSIS — O24.410 DIET CONTROLLED GESTATIONAL DIABETES MELLITUS (GDM) IN THIRD TRIMESTER: ICD-10-CM

## 2023-04-26 DIAGNOSIS — O36.8390 FETAL ARRHYTHMIA AFFECTING PREGNANCY, ANTEPARTUM: ICD-10-CM

## 2023-04-26 DIAGNOSIS — Z3A.34 34 WEEKS GESTATION OF PREGNANCY: Primary | ICD-10-CM

## 2023-04-26 LAB
GLUCOSE (URINE DIPSTICK): NEGATIVE MG/DL
MULTISTIX LOT#: NORMAL NUMERIC

## 2023-04-26 PROCEDURE — 59025 FETAL NON-STRESS TEST: CPT | Performed by: OBSTETRICS & GYNECOLOGY

## 2023-04-26 PROCEDURE — 3008F BODY MASS INDEX DOCD: CPT | Performed by: OBSTETRICS & GYNECOLOGY

## 2023-04-26 PROCEDURE — 3078F DIAST BP <80 MM HG: CPT | Performed by: OBSTETRICS & GYNECOLOGY

## 2023-04-26 PROCEDURE — 81002 URINALYSIS NONAUTO W/O SCOPE: CPT | Performed by: OBSTETRICS & GYNECOLOGY

## 2023-04-26 PROCEDURE — 3074F SYST BP LT 130 MM HG: CPT | Performed by: OBSTETRICS & GYNECOLOGY

## 2023-04-26 NOTE — PROGRESS NOTES
Patient presents with:  Prenatal Care: NST/ANTWAN    Routine prenatal visit without complaints. Patient denies any bleeding, leaking fluid, cramping, or regular uterine contractions. Good fetal movement. Assessment/Plan:  34w6d doing well  Plan RCS  GDM- BS's doing well. Hx of fetal arhythmia- weekly NST  Poor weight gain- counseled. Growth US MFM next week. Kick counts reminded  Reviewed  labor signs and symptoms. Reviewed vaccine recommendations: Tdap done. Diagnoses and all orders for this visit:    34 weeks gestation of pregnancy  -     URINALYSIS NONAUTO W/O SCOPE (OB URISTIX)    Diet controlled gestational diabetes mellitus (GDM) in third trimester  -     FETAL NON-STRESS TEST EMG ONLY 43665; Future    Fetal arrhythmia affecting pregnancy, antepartum  -     FETAL NON-STRESS TEST EMG ONLY 05840;  Future    Low weight gain during pregnancy in third trimester       Return in about 1 week (around 5/3/2023) for Routine Prenatal Visit, NST.

## 2023-04-27 NOTE — TELEPHONE ENCOUNTER
From: Deanna Valentine  To: Chet Chase MD  Sent: 4/26/2023 6:42 AM CDT  Subject: Past week glucose records     Dear Dr. Ted Knapp,  Good morning! Attached form shows my past one-week blood glucose level and the possible reasons for elevated level results. See you later!     Best wishes,  Michelle Wall

## 2023-05-04 ENCOUNTER — OFFICE VISIT (OUTPATIENT)
Dept: PERINATAL CARE | Facility: HOSPITAL | Age: 33
End: 2023-05-04
Attending: OBSTETRICS & GYNECOLOGY
Payer: COMMERCIAL

## 2023-05-04 ENCOUNTER — ROUTINE PRENATAL (OUTPATIENT)
Dept: OBGYN CLINIC | Facility: CLINIC | Age: 33
End: 2023-05-04
Payer: COMMERCIAL

## 2023-05-04 ENCOUNTER — APPOINTMENT (OUTPATIENT)
Dept: OBGYN CLINIC | Facility: CLINIC | Age: 33
End: 2023-05-04
Payer: COMMERCIAL

## 2023-05-04 VITALS
DIASTOLIC BLOOD PRESSURE: 52 MMHG | BODY MASS INDEX: 24 KG/M2 | WEIGHT: 137.19 LBS | SYSTOLIC BLOOD PRESSURE: 90 MMHG | HEART RATE: 86 BPM

## 2023-05-04 VITALS
WEIGHT: 137 LBS | HEART RATE: 80 BPM | BODY MASS INDEX: 23.39 KG/M2 | DIASTOLIC BLOOD PRESSURE: 64 MMHG | HEIGHT: 64 IN | SYSTOLIC BLOOD PRESSURE: 100 MMHG

## 2023-05-04 DIAGNOSIS — B19.10 HEPATITIS B AFFECTING PREGNANCY: ICD-10-CM

## 2023-05-04 DIAGNOSIS — O36.8390 FETAL ARRHYTHMIA AFFECTING PREGNANCY, ANTEPARTUM: ICD-10-CM

## 2023-05-04 DIAGNOSIS — O98.419 HEPATITIS B AFFECTING PREGNANCY: ICD-10-CM

## 2023-05-04 DIAGNOSIS — O26.13 POOR WEIGHT GAIN OF PREGNANCY, THIRD TRIMESTER: ICD-10-CM

## 2023-05-04 DIAGNOSIS — O24.410 DIET CONTROLLED GESTATIONAL DIABETES MELLITUS (GDM) IN THIRD TRIMESTER: Primary | ICD-10-CM

## 2023-05-04 DIAGNOSIS — B18.1 CHRONIC HEPATITIS B WITHOUT DELTA AGENT WITHOUT HEPATIC COMA (HCC): ICD-10-CM

## 2023-05-04 DIAGNOSIS — Z98.891 HISTORY OF CESAREAN DELIVERY: ICD-10-CM

## 2023-05-04 DIAGNOSIS — Z34.80 PRENATAL CARE OF MULTIGRAVIDA, ANTEPARTUM: ICD-10-CM

## 2023-05-04 DIAGNOSIS — O24.410 DIET CONTROLLED GESTATIONAL DIABETES MELLITUS (GDM) IN THIRD TRIMESTER: ICD-10-CM

## 2023-05-04 DIAGNOSIS — Z34.80 SUPERVISION OF OTHER NORMAL PREGNANCY, ANTEPARTUM: Primary | ICD-10-CM

## 2023-05-04 PROCEDURE — 87653 STREP B DNA AMP PROBE: CPT | Performed by: OBSTETRICS & GYNECOLOGY

## 2023-05-04 PROCEDURE — 59025 FETAL NON-STRESS TEST: CPT | Performed by: OBSTETRICS & GYNECOLOGY

## 2023-05-04 PROCEDURE — 3074F SYST BP LT 130 MM HG: CPT | Performed by: OBSTETRICS & GYNECOLOGY

## 2023-05-04 PROCEDURE — 87081 CULTURE SCREEN ONLY: CPT | Performed by: OBSTETRICS & GYNECOLOGY

## 2023-05-04 PROCEDURE — 76816 OB US FOLLOW-UP PER FETUS: CPT | Performed by: OBSTETRICS & GYNECOLOGY

## 2023-05-04 PROCEDURE — 76819 FETAL BIOPHYS PROFIL W/O NST: CPT

## 2023-05-04 PROCEDURE — 81002 URINALYSIS NONAUTO W/O SCOPE: CPT | Performed by: OBSTETRICS & GYNECOLOGY

## 2023-05-04 PROCEDURE — 3078F DIAST BP <80 MM HG: CPT | Performed by: OBSTETRICS & GYNECOLOGY

## 2023-05-04 NOTE — PROGRESS NOTES
Pt here for Growth Ultrasound  +fm noted per patient  Pt noted occas lower abdominal itching.  Pt denies further complaints

## 2023-05-04 NOTE — PROGRESS NOTES
ANTWAN    GA: 36w0d   23  1258   BP: 90/52   Pulse: 86   Weight: 137 lb 3.2 oz (62.2 kg)       Doing well, +FM  Denies LOF/VB/uctx  Mode of delivery: RCS anticipated  SVE 0/50/-3   GBS collected   Fetal movement count given  Labor precautions provided   Glucose log reviewed and d/w patient, continue current management     Problem List Items Addressed This Visit        Endocrine and Metabolic    Diet controlled gestational diabetes mellitus (GDM) in third trimester    Relevant Orders    FETAL NON-STRESS TEST EMG ONLY 78842 (Completed)       Gastrointestinal and Abdominal    Hepatitis B affecting pregnancy    Relevant Orders    FETAL NON-STRESS TEST EMG ONLY 54420 (Completed)    Chronic hepatitis B without delta agent without hepatic coma (HCC)    Relevant Orders    FETAL NON-STRESS TEST EMG ONLY 21501 (Completed)       Gravid and     History of  delivery    Supervision of other normal pregnancy, antepartum - Primary    Relevant Orders    STREP B CULTURE    Poor weight gain of pregnancy, third trimester    Relevant Orders    FETAL NON-STRESS TEST EMG ONLY 69563 (Completed)    Fetal arrhythmia affecting pregnancy, antepartum    Relevant Orders    FETAL NON-STRESS TEST EMG ONLY 18226 (Completed)   Other Visit Diagnoses     Prenatal care of multigravida, antepartum        Relevant Orders    URINALYSIS NONAUTO W/O SCOPE (OB URISTIX) (Completed)            RTC 1 week w/ NST    NST reactive and reassuring           Note to patient and family   The [facility] makes medical notes available to patients in the interest of transparency. However, please be advised that this is a medical document. It is intended as hhbz-bt-cpbc communication. It is written and medical language may contain abbreviations or verbiage that are technical and unfamiliar. It may appear blunt or direct.   Medical documents are intended to carry relevant information, facts as evident, and the clinical opinion of the practitioner.

## 2023-05-06 LAB — GROUP B STREP BY PCR FOR PCR OVT: NEGATIVE

## 2023-05-10 ENCOUNTER — ULTRASOUND ENCOUNTER (OUTPATIENT)
Dept: OBGYN CLINIC | Facility: CLINIC | Age: 33
End: 2023-05-10
Payer: COMMERCIAL

## 2023-05-10 ENCOUNTER — ROUTINE PRENATAL (OUTPATIENT)
Dept: OBGYN CLINIC | Facility: CLINIC | Age: 33
End: 2023-05-10
Payer: COMMERCIAL

## 2023-05-10 ENCOUNTER — HOSPITAL ENCOUNTER (OUTPATIENT)
Facility: HOSPITAL | Age: 33
Discharge: HOME | End: 2023-05-10
Attending: OBSTETRICS & GYNECOLOGY | Admitting: OBSTETRICS & GYNECOLOGY
Payer: COMMERCIAL

## 2023-05-10 ENCOUNTER — PATIENT MESSAGE (OUTPATIENT)
Dept: OBGYN CLINIC | Facility: CLINIC | Age: 33
End: 2023-05-10

## 2023-05-10 VITALS — SYSTOLIC BLOOD PRESSURE: 104 MMHG | DIASTOLIC BLOOD PRESSURE: 62 MMHG | HEART RATE: 79 BPM

## 2023-05-10 DIAGNOSIS — Z34.80 PRENATAL CARE OF MULTIGRAVIDA, ANTEPARTUM: Primary | ICD-10-CM

## 2023-05-10 DIAGNOSIS — N91.2 AMENORRHEA: Primary | ICD-10-CM

## 2023-05-10 DIAGNOSIS — O24.410 DIET CONTROLLED GESTATIONAL DIABETES MELLITUS (GDM) IN THIRD TRIMESTER: ICD-10-CM

## 2023-05-10 DIAGNOSIS — O41.00X0 OLIGOHYDRAMNIOS, ANTEPARTUM, SINGLE OR UNSPECIFIED FETUS: ICD-10-CM

## 2023-05-10 PROCEDURE — 99213 OFFICE O/P EST LOW 20 MIN: CPT

## 2023-05-10 PROCEDURE — 59025 FETAL NON-STRESS TEST: CPT

## 2023-05-10 NOTE — NST
Nonstress Test   Patient: Radames Simper    Gestation: 36w6d    NST:       Variability: Moderate           Accelerations: Yes           Decelerations: None            Baseline: 135 BPM           Uterine Irritability: No           Contractions: Not present                                        Acoustic Stimulator: No           Nonstress Test Interpretation: Reactive                                 Additional Comments

## 2023-05-10 NOTE — TELEPHONE ENCOUNTER
From: Greer Katz  To: Michael Mora MD  Sent: 5/10/2023 7:25 AM CDT  Subject: Past week glucose record     Dear Dr. Paul Chambers morning! Attached form shows my past one-week blood glucose level and the possible reasons for elevated level results. See you this afternoon.      Best wishes,  Darylene Hopkins

## 2023-05-10 NOTE — DISCHARGE INSTRUCTIONS
Discharge Instructions    Diet: Regular  Activity: Normal activity         General Instructions    Call your [facility] doctor if: Fluid leaking from your vagina;Uterine contractions 10 minutes or closer for 1 to 2 hours;Uterine contractions increasing in intensity and frequency; Decrease in fetal movement;Vaginal bleeding;Temperature greater than 100F;Vaginal or rectal pressure  Early labor comfort measures: Drink fluids and eat small light meals;Relax, sleep, take a warm bath or shower for 30 minutes or less; Take a walk

## 2023-05-10 NOTE — TELEPHONE ENCOUNTER
Please see attached patient glucose readings.   Patient has [facility] appointment today 5/10/23 at 2:45 pm.

## 2023-05-10 NOTE — PROGRESS NOTES
Cindy scheduled for ANTWAN/ NST  BPP done due to provider scheduled with was sick  SIN was 4.48 and 5.86  She was notified to return Friday to repeat her SIN  Sent to L&D per original provider for prolonged monitoring  Emphasized the need for goof fetal movement, reviewed signs of labor

## 2023-05-10 NOTE — PROGRESS NOTES
NST performed today due to borderline SIN seen in the office  Baseline: 140 bpm  Variability: moderate  Accelerations: present  Decelerations: absent  [place]: no uterine contractions    Levon Bo MD, 05/10/23, 4:33 PM

## 2023-05-10 NOTE — PROGRESS NOTES
Pt is a 34year old female admitted to TR/TRG2-A. Patient presents with:  Non-stress Test     Pt is  36w6d intra-uterine pregnancy. History obtained, consents signed. Oriented to room, staff, and plan of care.

## 2023-05-11 NOTE — TELEPHONE ENCOUNTER
Reviewed glucose log. Continue to monitor glucose 4 times daily. Continue current diet and activities.

## 2023-05-12 ENCOUNTER — LAB ENCOUNTER (OUTPATIENT)
Dept: LAB | Age: 33
End: 2023-05-12
Attending: OBSTETRICS & GYNECOLOGY
Payer: COMMERCIAL

## 2023-05-12 ENCOUNTER — ULTRASOUND ENCOUNTER (OUTPATIENT)
Dept: OBGYN CLINIC | Facility: CLINIC | Age: 33
End: 2023-05-12
Payer: COMMERCIAL

## 2023-05-12 ENCOUNTER — ROUTINE PRENATAL (OUTPATIENT)
Dept: OBGYN CLINIC | Facility: CLINIC | Age: 33
End: 2023-05-12
Payer: COMMERCIAL

## 2023-05-12 VITALS
WEIGHT: 138.13 LBS | HEART RATE: 95 BPM | SYSTOLIC BLOOD PRESSURE: 98 MMHG | BODY MASS INDEX: 23.58 KG/M2 | HEIGHT: 64 IN | DIASTOLIC BLOOD PRESSURE: 54 MMHG

## 2023-05-12 DIAGNOSIS — B19.10 HEPATITIS B AFFECTING PREGNANCY: ICD-10-CM

## 2023-05-12 DIAGNOSIS — O98.419 HEPATITIS B AFFECTING PREGNANCY: ICD-10-CM

## 2023-05-12 DIAGNOSIS — Z3A.37 37 WEEKS GESTATION OF PREGNANCY: Primary | ICD-10-CM

## 2023-05-12 DIAGNOSIS — O41.00X0 OLIGOHYDRAMNIOS, ANTEPARTUM, SINGLE OR UNSPECIFIED FETUS: ICD-10-CM

## 2023-05-12 LAB
ALBUMIN SERPL-MCNC: 2.8 G/DL (ref 3.4–5)
ALP LIVER SERPL-CCNC: 94 U/L
ALT SERPL-CCNC: 20 U/L
AST SERPL-CCNC: 13 U/L (ref 15–37)
BILIRUB DIRECT SERPL-MCNC: <0.1 MG/DL (ref 0–0.2)
BILIRUB SERPL-MCNC: 0.4 MG/DL (ref 0.1–2)
GLUCOSE (URINE DIPSTICK): NEGATIVE MG/DL
MULTISTIX LOT#: NORMAL NUMERIC
PROT SERPL-MCNC: 6.3 G/DL (ref 6.4–8.2)
PROTEIN (URINE DIPSTICK): NEGATIVE MG/DL

## 2023-05-12 PROCEDURE — 80076 HEPATIC FUNCTION PANEL: CPT

## 2023-05-12 PROCEDURE — 3074F SYST BP LT 130 MM HG: CPT | Performed by: OBSTETRICS & GYNECOLOGY

## 2023-05-12 PROCEDURE — 3078F DIAST BP <80 MM HG: CPT | Performed by: OBSTETRICS & GYNECOLOGY

## 2023-05-12 PROCEDURE — 3008F BODY MASS INDEX DOCD: CPT | Performed by: OBSTETRICS & GYNECOLOGY

## 2023-05-12 PROCEDURE — 76819 FETAL BIOPHYS PROFIL W/O NST: CPT | Performed by: OBSTETRICS & GYNECOLOGY

## 2023-05-12 PROCEDURE — 87517 HEPATITIS B DNA QUANT: CPT

## 2023-05-12 PROCEDURE — 81002 URINALYSIS NONAUTO W/O SCOPE: CPT | Performed by: OBSTETRICS & GYNECOLOGY

## 2023-05-12 NOTE — PROGRESS NOTES
37w1d seen for routine OB visit. Denies ctx, lof, vb. Reports good FM. Patient Active Problem List:     Hepatitis B affecting pregnancy     History of  delivery     Supervision of other normal pregnancy, antepartum     Chronic hepatitis B without delta agent without hepatic coma (HCC)     Hyperprolactinemia (HCC)     Diet controlled gestational diabetes mellitus (GDM) in third trimester     Poor weight gain of pregnancy, third trimester     Fetal arrhythmia affecting pregnancy, antepartum     Low weight gain during pregnancy in third trimester       Gen: AAOx3, NAD  Resp: breathing comfortably  Abdomen: gravid, soft, nontender  Ext: nontender, no edema    US: Cephalic, , anterior placenta, SIN 6.7cm with SDP 4.6cm.  +Fetal movements  +Fetal breathing  +Fetal tone  +AFV   BPP    Plan:  - US reviewed - SIN improved, adequate SDP, continue to monitor with weekly NST as scheduled  - blood glucose log reviewed - largely wnl, will decrease to twice daily checks  - HBV quant and LFP ordered  - GBS neg  - patient with questions regarding postpartum diabetes screening and contraception  - return precautions reviewed    RTO in 1 week(s).

## 2023-05-14 LAB
HBV IU/ML  2: 50 IU/ML
LOG10 HBV IU/ML: 1.7 LOG10 IU/ML

## 2023-05-17 ENCOUNTER — PATIENT MESSAGE (OUTPATIENT)
Dept: OBGYN CLINIC | Facility: CLINIC | Age: 33
End: 2023-05-17

## 2023-05-17 ENCOUNTER — TELEPHONE (OUTPATIENT)
Dept: OBGYN UNIT | Facility: HOSPITAL | Age: 33
End: 2023-05-17

## 2023-05-17 ENCOUNTER — ROUTINE PRENATAL (OUTPATIENT)
Dept: OBGYN CLINIC | Facility: CLINIC | Age: 33
End: 2023-05-17
Payer: COMMERCIAL

## 2023-05-17 ENCOUNTER — APPOINTMENT (OUTPATIENT)
Dept: OBGYN CLINIC | Facility: CLINIC | Age: 33
End: 2023-05-17
Payer: COMMERCIAL

## 2023-05-17 VITALS
HEIGHT: 64 IN | DIASTOLIC BLOOD PRESSURE: 52 MMHG | SYSTOLIC BLOOD PRESSURE: 92 MMHG | WEIGHT: 137.38 LBS | HEART RATE: 99 BPM | BODY MASS INDEX: 23.45 KG/M2

## 2023-05-17 DIAGNOSIS — O24.410 DIET CONTROLLED GESTATIONAL DIABETES MELLITUS (GDM) IN THIRD TRIMESTER: ICD-10-CM

## 2023-05-17 DIAGNOSIS — B19.10 HEPATITIS B AFFECTING PREGNANCY: Primary | ICD-10-CM

## 2023-05-17 DIAGNOSIS — O41.00X0 OLIGOHYDRAMNIOS, ANTEPARTUM, SINGLE OR UNSPECIFIED FETUS: ICD-10-CM

## 2023-05-17 DIAGNOSIS — Z34.80 PRENATAL CARE OF MULTIGRAVIDA, ANTEPARTUM: ICD-10-CM

## 2023-05-17 DIAGNOSIS — O98.419 HEPATITIS B AFFECTING PREGNANCY: Primary | ICD-10-CM

## 2023-05-17 DIAGNOSIS — B18.1 CHRONIC HEPATITIS B WITHOUT DELTA AGENT WITHOUT HEPATIC COMA (HCC): ICD-10-CM

## 2023-05-17 PROCEDURE — 59025 FETAL NON-STRESS TEST: CPT | Performed by: OBSTETRICS & GYNECOLOGY

## 2023-05-17 PROCEDURE — 81002 URINALYSIS NONAUTO W/O SCOPE: CPT | Performed by: OBSTETRICS & GYNECOLOGY

## 2023-05-17 PROCEDURE — 3078F DIAST BP <80 MM HG: CPT | Performed by: OBSTETRICS & GYNECOLOGY

## 2023-05-17 PROCEDURE — 3074F SYST BP LT 130 MM HG: CPT | Performed by: OBSTETRICS & GYNECOLOGY

## 2023-05-17 PROCEDURE — 3008F BODY MASS INDEX DOCD: CPT | Performed by: OBSTETRICS & GYNECOLOGY

## 2023-05-17 NOTE — PROGRESS NOTES
37w6d seen for routine OB visit. Denies ctx, lof, vb. Reports good FM. Patient Active Problem List:     Hepatitis B affecting pregnancy     History of  delivery     Supervision of other normal pregnancy, antepartum     Chronic hepatitis B without delta agent without hepatic coma (HCC)     Hyperprolactinemia (HCC)     Diet controlled gestational diabetes mellitus (GDM) in third trimester     Poor weight gain of pregnancy, third trimester     Fetal arrhythmia affecting pregnancy, antepartum     Oligohydramnios, antepartum       Gen: AAOx3, NAD  Resp: breathing comfortably  Abdomen: gravid, soft, nontender  Ext: nontender, no edema    NST - baseline 140, moderate variability, +15x15 accels, no decels  [place] - quiet     Plan:  - reactive NST, continue weekly  - HBV quant remains low, LFTs wnl  - repeat CS scheduled   - questions about pediatricians and circumcisions, answered to their satisfaction  - return precautions reviewed    RTO in 1 week(s).

## 2023-05-17 NOTE — PATIENT INSTRUCTIONS
Pediatrician/Family Practice Referral    Pediatricians:    [facility]  [phone]  [facility], [address] [facility] [id number]  [facility], [address]    [address] [facility]  [address] [facility], [facility]    [facility]  [phone]  [facility] [address]        Family Practice:    Dr. Benítez   [address] [facility], [address] [place] [address]    Dr. Tiffanie Rose  [address] [facility], [place]  [phone]    Dr. Girish Young, [place] [address]    Dr. Candida Green and Dr. Karthik Roblero [id number]  [place]  [phone]    Dr. Dwight Patino and Dr. Lan Meyer  [phone]      Dr. Cassy Castillo and Dr. Chato Mesa  [place], [place]  [phone]    Dr. Uriel Ahn and Dr. Sekou Aguilar  [facility] [address], [address] [place] [address]

## 2023-05-18 NOTE — TELEPHONE ENCOUNTER
From: Ortega Oropeza  To: Mychal Tobias MD  Sent: 5/17/2023 4:00 PM CDT  Subject: Past week glucose level    Dear Dr. Ej Richard,    Louis afternoon. Attached form shows my past one-week blood glucose level and the possible reasons for elevated level results.      Best wishes,  Gama Shay

## 2023-05-23 ENCOUNTER — ROUTINE PRENATAL (OUTPATIENT)
Dept: OBGYN CLINIC | Facility: CLINIC | Age: 33
End: 2023-05-23
Payer: COMMERCIAL

## 2023-05-23 ENCOUNTER — APPOINTMENT (OUTPATIENT)
Dept: OBGYN CLINIC | Facility: CLINIC | Age: 33
End: 2023-05-23
Payer: COMMERCIAL

## 2023-05-23 VITALS
BODY MASS INDEX: 24 KG/M2 | SYSTOLIC BLOOD PRESSURE: 102 MMHG | DIASTOLIC BLOOD PRESSURE: 60 MMHG | HEART RATE: 83 BPM | WEIGHT: 137.81 LBS

## 2023-05-23 DIAGNOSIS — O24.410 DIET CONTROLLED GESTATIONAL DIABETES MELLITUS (GDM) IN THIRD TRIMESTER: ICD-10-CM

## 2023-05-23 DIAGNOSIS — Z34.90 PRENATAL CARE, ANTEPARTUM: Primary | ICD-10-CM

## 2023-05-23 PROCEDURE — 3078F DIAST BP <80 MM HG: CPT | Performed by: OBSTETRICS & GYNECOLOGY

## 2023-05-23 PROCEDURE — 3074F SYST BP LT 130 MM HG: CPT | Performed by: OBSTETRICS & GYNECOLOGY

## 2023-05-23 PROCEDURE — 81002 URINALYSIS NONAUTO W/O SCOPE: CPT | Performed by: OBSTETRICS & GYNECOLOGY

## 2023-05-23 PROCEDURE — 59025 FETAL NON-STRESS TEST: CPT | Performed by: OBSTETRICS & GYNECOLOGY

## 2023-05-23 NOTE — PROGRESS NOTES
No chief complaint on file. Routine prenatal visit without complaints. Patient denies any bleeding, leaking fluid, cramping, or regular uterine contractions. Good fetal movement. NST: see report. Reactive  Assessment/Plan:  38w5d doing well  GBS neg  GDM  RCS Thursday  Kick counts reviewed. Reviewed labor signs and symptoms. Diagnoses and all orders for this visit:    Prenatal care, antepartum  -     URINALYSIS NONAUTO W/O SCOPE (OB URISTIX)    Diet controlled gestational diabetes mellitus (GDM) in third trimester  -     FETAL NON-STRESS TEST EMG ONLY 58211; Future       Return in about 2 weeks (around 6/6/2023) for Post Operative Visit.

## 2023-05-25 ENCOUNTER — HOSPITAL ENCOUNTER (INPATIENT)
Facility: HOSPITAL | Age: 33
LOS: 3 days | Discharge: HOME | End: 2023-05-28
Attending: OBSTETRICS & GYNECOLOGY | Admitting: OBSTETRICS & GYNECOLOGY
Payer: COMMERCIAL

## 2023-05-25 ENCOUNTER — ANESTHESIA EVENT (OUTPATIENT)
Dept: OBGYN UNIT | Facility: HOSPITAL | Age: 33
End: 2023-05-25
Payer: COMMERCIAL

## 2023-05-25 ENCOUNTER — ANESTHESIA (OUTPATIENT)
Dept: OBGYN UNIT | Facility: HOSPITAL | Age: 33
End: 2023-05-25
Payer: COMMERCIAL

## 2023-05-25 PROBLEM — Z98.891 STATUS POST REPEAT LOW TRANSVERSE CESAREAN SECTION: Status: ACTIVE | Noted: 2023-05-25

## 2023-05-25 PROBLEM — Z34.90 PREGNANCY: Status: ACTIVE | Noted: 2023-05-25

## 2023-05-25 PROBLEM — Z34.90 PREGNANCY (HCC): Status: ACTIVE | Noted: 2023-05-25

## 2023-05-25 LAB
ANTIBODY SCREEN: NEGATIVE
BASOPHILS # BLD AUTO: 0.02 X10(3) UL (ref 0–0.2)
BASOPHILS NFR BLD AUTO: 0.3 %
EOSINOPHIL # BLD AUTO: 0.04 X10(3) UL (ref 0–0.7)
EOSINOPHIL NFR BLD AUTO: 0.5 %
ERYTHROCYTE [DISTWIDTH] IN BLOOD BY AUTOMATED COUNT: 13.9 %
GLUCOSE BLD-MCNC: 92 MG/DL (ref 70–99)
HCT VFR BLD AUTO: 38.1 %
HGB BLD-MCNC: 12.8 G/DL
IMM GRANULOCYTES # BLD AUTO: 0.03 X10(3) UL (ref 0–1)
IMM GRANULOCYTES NFR BLD: 0.4 %
LYMPHOCYTES # BLD AUTO: 2.15 X10(3) UL (ref 1–4)
LYMPHOCYTES NFR BLD AUTO: 28 %
MCH RBC QN AUTO: 31.9 PG (ref 26–34)
MCHC RBC AUTO-ENTMCNC: 33.6 G/DL (ref 31–37)
MCV RBC AUTO: 95 FL
MONOCYTES # BLD AUTO: 0.49 X10(3) UL (ref 0.1–1)
MONOCYTES NFR BLD AUTO: 6.4 %
NEUTROPHILS # BLD AUTO: 4.94 X10 (3) UL (ref 1.5–7.7)
NEUTROPHILS # BLD AUTO: 4.94 X10(3) UL (ref 1.5–7.7)
NEUTROPHILS NFR BLD AUTO: 64.4 %
PLATELET # BLD AUTO: 175 10(3)UL (ref 150–450)
RBC # BLD AUTO: 4.01 X10(6)UL
RH BLOOD TYPE: POSITIVE
T PALLIDUM AB SER QL IA: NONREACTIVE
WBC # BLD AUTO: 7.7 X10(3) UL (ref 4–11)

## 2023-05-25 PROCEDURE — 59515 CESAREAN DELIVERY: CPT | Performed by: OBSTETRICS & GYNECOLOGY

## 2023-05-25 RX ORDER — ONDANSETRON 2 MG/ML
4 INJECTION INTRAMUSCULAR; INTRAVENOUS ONCE AS NEEDED
Status: DISCONTINUED | OUTPATIENT
Start: 2023-05-25 | End: 2023-05-25 | Stop reason: HOSPADM

## 2023-05-25 RX ORDER — ACETAMINOPHEN 500 MG
1000 TABLET ORAL EVERY 6 HOURS
Status: DISCONTINUED | OUTPATIENT
Start: 2023-05-25 | End: 2023-05-28

## 2023-05-25 RX ORDER — CEFAZOLIN SODIUM/WATER 2 G/20 ML
2 SYRINGE (ML) INTRAVENOUS ONCE
Status: COMPLETED | OUTPATIENT
Start: 2023-05-25 | End: 2023-05-25

## 2023-05-25 RX ORDER — IBUPROFEN 600 MG/1
600 TABLET ORAL EVERY 6 HOURS
Status: DISCONTINUED | OUTPATIENT
Start: 2023-05-26 | End: 2023-05-28

## 2023-05-25 RX ORDER — NALOXONE HYDROCHLORIDE 0.4 MG/ML
0.08 INJECTION, SOLUTION INTRAMUSCULAR; INTRAVENOUS; SUBCUTANEOUS
Status: ACTIVE | OUTPATIENT
Start: 2023-05-25 | End: 2023-05-26

## 2023-05-25 RX ORDER — SODIUM CHLORIDE, SODIUM LACTATE, POTASSIUM CHLORIDE, CALCIUM CHLORIDE 600; 310; 30; 20 MG/100ML; MG/100ML; MG/100ML; MG/100ML
INJECTION, SOLUTION INTRAVENOUS CONTINUOUS
Status: DISCONTINUED | OUTPATIENT
Start: 2023-05-25 | End: 2023-05-28

## 2023-05-25 RX ORDER — DIPHENHYDRAMINE HYDROCHLORIDE 50 MG/ML
12.5 INJECTION INTRAMUSCULAR; INTRAVENOUS EVERY 4 HOURS PRN
Status: DISCONTINUED | OUTPATIENT
Start: 2023-05-25 | End: 2023-05-28

## 2023-05-25 RX ORDER — NALBUPHINE HYDROCHLORIDE 10 MG/ML
2.5 INJECTION, SOLUTION INTRAMUSCULAR; INTRAVENOUS; SUBCUTANEOUS EVERY 4 HOURS PRN
Status: DISCONTINUED | OUTPATIENT
Start: 2023-05-25 | End: 2023-05-28

## 2023-05-25 RX ORDER — OXYCODONE HYDROCHLORIDE 5 MG/1
5 TABLET ORAL EVERY 6 HOURS PRN
Status: DISCONTINUED | OUTPATIENT
Start: 2023-05-25 | End: 2023-05-28

## 2023-05-25 RX ORDER — ONDANSETRON 2 MG/ML
4 INJECTION INTRAMUSCULAR; INTRAVENOUS EVERY 6 HOURS PRN
Status: DISCONTINUED | OUTPATIENT
Start: 2023-05-25 | End: 2023-05-28

## 2023-05-25 RX ORDER — DEXTROSE, SODIUM CHLORIDE, SODIUM LACTATE, POTASSIUM CHLORIDE, AND CALCIUM CHLORIDE 5; .6; .31; .03; .02 G/100ML; G/100ML; G/100ML; G/100ML; G/100ML
INJECTION, SOLUTION INTRAVENOUS CONTINUOUS PRN
Status: DISCONTINUED | OUTPATIENT
Start: 2023-05-25 | End: 2023-05-28

## 2023-05-25 RX ORDER — ONDANSETRON 2 MG/ML
4 INJECTION INTRAMUSCULAR; INTRAVENOUS EVERY 6 HOURS PRN
Status: DISCONTINUED | OUTPATIENT
Start: 2023-05-25 | End: 2023-05-25

## 2023-05-25 RX ORDER — SIMETHICONE 80 MG
80 TABLET,CHEWABLE ORAL 3 TIMES DAILY PRN
Status: DISCONTINUED | OUTPATIENT
Start: 2023-05-25 | End: 2023-05-28

## 2023-05-25 RX ORDER — DEXTROSE MONOHYDRATE 25 G/50ML
50 INJECTION, SOLUTION INTRAVENOUS
Status: DISCONTINUED | OUTPATIENT
Start: 2023-05-25 | End: 2023-05-25 | Stop reason: HOSPADM

## 2023-05-25 RX ORDER — GABAPENTIN 300 MG/1
300 CAPSULE ORAL EVERY 8 HOURS PRN
Status: DISCONTINUED | OUTPATIENT
Start: 2023-05-25 | End: 2023-05-28

## 2023-05-25 RX ORDER — KETOROLAC TROMETHAMINE 30 MG/ML
INJECTION, SOLUTION INTRAMUSCULAR; INTRAVENOUS
Status: COMPLETED
Start: 2023-05-25 | End: 2023-05-25

## 2023-05-25 RX ORDER — KETOROLAC TROMETHAMINE 30 MG/ML
30 INJECTION, SOLUTION INTRAMUSCULAR; INTRAVENOUS ONCE AS NEEDED
Status: COMPLETED | OUTPATIENT
Start: 2023-05-25 | End: 2023-05-25

## 2023-05-25 RX ORDER — MORPHINE SULFATE 2 MG/ML
INJECTION, SOLUTION INTRAMUSCULAR; INTRAVENOUS AS NEEDED
Status: DISCONTINUED | OUTPATIENT
Start: 2023-05-25 | End: 2023-05-25 | Stop reason: SURG

## 2023-05-25 RX ORDER — DEXAMETHASONE SODIUM PHOSPHATE 4 MG/ML
VIAL (ML) INJECTION AS NEEDED
Status: DISCONTINUED | OUTPATIENT
Start: 2023-05-25 | End: 2023-05-25 | Stop reason: SURG

## 2023-05-25 RX ORDER — DIPHENHYDRAMINE HYDROCHLORIDE 50 MG/ML
25 INJECTION INTRAMUSCULAR; INTRAVENOUS ONCE AS NEEDED
Status: DISCONTINUED | OUTPATIENT
Start: 2023-05-25 | End: 2023-05-25 | Stop reason: HOSPADM

## 2023-05-25 RX ORDER — HYDROMORPHONE HYDROCHLORIDE 1 MG/ML
0.3 INJECTION, SOLUTION INTRAMUSCULAR; INTRAVENOUS; SUBCUTANEOUS EVERY 2 HOUR PRN
Status: DISCONTINUED | OUTPATIENT
Start: 2023-05-25 | End: 2023-05-28

## 2023-05-25 RX ORDER — NICOTINE POLACRILEX 4 MG
30 LOZENGE BUCCAL
Status: DISCONTINUED | OUTPATIENT
Start: 2023-05-25 | End: 2023-05-25 | Stop reason: HOSPADM

## 2023-05-25 RX ORDER — BUPIVACAINE HYDROCHLORIDE 7.5 MG/ML
INJECTION, SOLUTION INTRASPINAL AS NEEDED
Status: DISCONTINUED | OUTPATIENT
Start: 2023-05-25 | End: 2023-05-25 | Stop reason: SURG

## 2023-05-25 RX ORDER — TRISODIUM CITRATE DIHYDRATE AND CITRIC ACID MONOHYDRATE 500; 334 MG/5ML; MG/5ML
30 SOLUTION ORAL ONCE
Status: COMPLETED | OUTPATIENT
Start: 2023-05-25 | End: 2023-05-25

## 2023-05-25 RX ORDER — DOCUSATE SODIUM 100 MG/1
100 CAPSULE, LIQUID FILLED ORAL
Status: DISCONTINUED | OUTPATIENT
Start: 2023-05-25 | End: 2023-05-28

## 2023-05-25 RX ORDER — SODIUM CHLORIDE, SODIUM LACTATE, POTASSIUM CHLORIDE, CALCIUM CHLORIDE 600; 310; 30; 20 MG/100ML; MG/100ML; MG/100ML; MG/100ML
125 INJECTION, SOLUTION INTRAVENOUS CONTINUOUS
Status: DISCONTINUED | OUTPATIENT
Start: 2023-05-25 | End: 2023-05-25

## 2023-05-25 RX ORDER — DIPHENHYDRAMINE HCL 25 MG
25 CAPSULE ORAL EVERY 4 HOURS PRN
Status: DISCONTINUED | OUTPATIENT
Start: 2023-05-25 | End: 2023-05-28

## 2023-05-25 RX ORDER — ACETAMINOPHEN 500 MG
1000 TABLET ORAL ONCE
Status: COMPLETED | OUTPATIENT
Start: 2023-05-25 | End: 2023-05-25

## 2023-05-25 RX ORDER — ONDANSETRON 2 MG/ML
INJECTION INTRAMUSCULAR; INTRAVENOUS AS NEEDED
Status: DISCONTINUED | OUTPATIENT
Start: 2023-05-25 | End: 2023-05-25 | Stop reason: SURG

## 2023-05-25 RX ORDER — NICOTINE POLACRILEX 4 MG
15 LOZENGE BUCCAL
Status: DISCONTINUED | OUTPATIENT
Start: 2023-05-25 | End: 2023-05-25 | Stop reason: HOSPADM

## 2023-05-25 RX ORDER — KETOROLAC TROMETHAMINE 30 MG/ML
30 INJECTION, SOLUTION INTRAMUSCULAR; INTRAVENOUS EVERY 6 HOURS
Status: DISPENSED | OUTPATIENT
Start: 2023-05-25 | End: 2023-05-26

## 2023-05-25 RX ORDER — METOCLOPRAMIDE HYDROCHLORIDE 5 MG/ML
INJECTION INTRAMUSCULAR; INTRAVENOUS AS NEEDED
Status: DISCONTINUED | OUTPATIENT
Start: 2023-05-25 | End: 2023-05-25 | Stop reason: SURG

## 2023-05-25 RX ORDER — NALBUPHINE HYDROCHLORIDE 10 MG/ML
2.5 INJECTION, SOLUTION INTRAMUSCULAR; INTRAVENOUS; SUBCUTANEOUS
Status: DISCONTINUED | OUTPATIENT
Start: 2023-05-25 | End: 2023-05-28

## 2023-05-25 RX ORDER — BISACODYL 10 MG
10 SUPPOSITORY, RECTAL RECTAL ONCE AS NEEDED
Status: DISCONTINUED | OUTPATIENT
Start: 2023-05-25 | End: 2023-05-28

## 2023-05-25 RX ORDER — KETOROLAC TROMETHAMINE 30 MG/ML
30 INJECTION, SOLUTION INTRAMUSCULAR; INTRAVENOUS EVERY 6 HOURS PRN
Status: ACTIVE | OUTPATIENT
Start: 2023-05-25 | End: 2023-05-27

## 2023-05-25 RX ADMIN — DEXAMETHASONE SODIUM PHOSPHATE 4 MG: 4 MG/ML VIAL (ML) INJECTION at 07:51:00

## 2023-05-25 RX ADMIN — SODIUM CHLORIDE, SODIUM LACTATE, POTASSIUM CHLORIDE, CALCIUM CHLORIDE: 600; 310; 30; 20 INJECTION, SOLUTION INTRAVENOUS at 07:17:00

## 2023-05-25 RX ADMIN — SODIUM CHLORIDE, SODIUM LACTATE, POTASSIUM CHLORIDE, CALCIUM CHLORIDE: 600; 310; 30; 20 INJECTION, SOLUTION INTRAVENOUS at 08:43:00

## 2023-05-25 RX ADMIN — METOCLOPRAMIDE HYDROCHLORIDE 10 MG: 5 INJECTION INTRAMUSCULAR; INTRAVENOUS at 07:51:00

## 2023-05-25 RX ADMIN — ONDANSETRON 4 MG: 2 INJECTION INTRAMUSCULAR; INTRAVENOUS at 07:51:00

## 2023-05-25 RX ADMIN — CEFAZOLIN SODIUM/WATER 2 G: 2 G/20 ML SYRINGE (ML) INTRAVENOUS at 07:50:00

## 2023-05-25 RX ADMIN — MORPHINE SULFATE 0.2 MG: 2 INJECTION, SOLUTION INTRAMUSCULAR; INTRAVENOUS at 07:44:00

## 2023-05-25 RX ADMIN — BUPIVACAINE HYDROCHLORIDE 1.6 ML: 7.5 INJECTION, SOLUTION INTRASPINAL at 07:44:00

## 2023-05-25 NOTE — PROGRESS NOTES
PT is  female admitted to Triage 4. Patient represents with scheduled . PT is 39.0 week gestation intrauterine pregnancy. History obtained, consents signed. Oriented to room, staff and plan of care.

## 2023-05-25 NOTE — ANESTHESIA PROCEDURE NOTES
Spinal Block    Performed by: Flaco Nascimento DO  Authorized by: Flaco Nascimento DO      General Information and Staff    Start Time:   Anesthesiologist:  Flaco Nascimento DO  Performed by:   Anesthesiologist  Site identification: surface landmarks    Preanesthetic Checklist: patient identified, IV checked, risks and benefits discussed, monitors and equipment checked, pre-op evaluation, timeout performed, anesthesia consent and sterile technique used      Procedure Details    Patient Position:  Sitting  Prep: ChloraPrep    Monitoring:  Cardiac monitor, heart rate and continuous pulse ox  Approach:  Midline  Location:  L3-4  Injection Technique:  Single-shot    Needle    Needle Type:  Sprotte  Needle Gauge:  24 G  Needle Length:  3.5 in    Assessment    Sensory Level:   Events: clear CSF, CSF aspirated, well tolerated and blood negative      Additional Comments

## 2023-05-25 NOTE — PROGRESS NOTES
Patient and  transferred to room [phone] on cart in stable condition with belongings. Bedside report given to [facility], RN.

## 2023-05-25 NOTE — PLAN OF CARE
Problem: BIRTH - VAGINAL/ SECTION  Goal: Fetal and maternal status remain reassuring during the birth process  Description: INTERVENTIONS:  - Monitor vital signs  - Monitor fetal heart rate  - Monitor uterine activity  - Monitor labor progression (vaginal delivery)  - DVT prophylaxis (C/S delivery)  - Surgical antibiotic prophylaxis (C/S delivery)  Outcome: Progressing     Problem: PAIN - ADULT  Goal: Verbalizes/displays adequate comfort level or patient's stated pain goal  Description: INTERVENTIONS:  - Encourage pt to monitor pain and request assistance  - Assess pain using appropriate pain scale  - Administer analgesics based on type and severity of pain and evaluate response  - Implement non-pharmacological measures as appropriate and evaluate response  - Consider cultural and social influences on pain and pain management  - Manage/alleviate anxiety  - Utilize distraction and/or relaxation techniques  - Monitor for opioid side effects  - Notify MD/LIP if interventions unsuccessful or patient reports new pain  - Anticipate increased pain with activity and pre-medicate as appropriate  Outcome: Progressing     Problem: ANXIETY  Goal: Will report anxiety at manageable levels  Description: INTERVENTIONS:  - Administer medication as ordered  - Teach and rehearse alternative coping skills  - Provide emotional support with 1:1 interaction with staff  Outcome: Progressing     Problem: Patient/Family Goals  Goal: Patient/Family Long Term Goal  Description: Patient's Long Term Goal: Uncomplicated  section    Interventions:   VS per protocol  EFM per protocol   I&O   Diego catheter  Abd prep with clippers as needed   SCD to bilateral lower extremities  NPO   Insert/maintain IV access   Antibiotics per protocol   Informed consent    - See additional Care Plan goals for specific interventions  Outcome: Progressing  Goal: Patient/Family Short Term Goal  Description: Patient's Short Term Goal: pain management Interventions:   - taking PRN pain medication as needed  - repositioning  -using the abdominal binder  - See additional Care Plan goals for specific interventions  Outcome: Progressing

## 2023-05-25 NOTE — PROGRESS NOTES
Received in MBU - stable condition. Oriented to room, siderails up X2, bed in low position, call light within reach. Plan of care reviewed with pt and . Pt agrees to the plan of care.

## 2023-05-25 NOTE — PLAN OF CARE
Problem: BIRTH - VAGINAL/ SECTION  Goal: Fetal and maternal status remain reassuring during the birth process  Description: INTERVENTIONS:  - Monitor vital signs  - Monitor fetal heart rate  - Monitor uterine activity  - Monitor labor progression (vaginal delivery)  - DVT prophylaxis (C/S delivery)  - Surgical antibiotic prophylaxis (C/S delivery)  Outcome: Completed     Problem: PAIN - ADULT  Goal: Verbalizes/displays adequate comfort level or patient's stated pain goal  Description: INTERVENTIONS:  - Encourage pt to monitor pain and request assistance  - Assess pain using appropriate pain scale  - Administer analgesics based on type and severity of pain and evaluate response  - Implement non-pharmacological measures as appropriate and evaluate response  - Consider cultural and social influences on pain and pain management  - Manage/alleviate anxiety  - Utilize distraction and/or relaxation techniques  - Monitor for opioid side effects  - Notify MD/LIP if interventions unsuccessful or patient reports new pain  - Anticipate increased pain with activity and pre-medicate as appropriate  Outcome: Completed     Problem: ANXIETY  Goal: Will report anxiety at manageable levels  Description: INTERVENTIONS:  - Administer medication as ordered  - Teach and rehearse alternative coping skills  - Provide emotional support with 1:1 interaction with staff  Outcome: Completed     Problem: Patient/Family Goals  Goal: Patient/Family Long Term Goal  Description: Patient's Long Term Goal: Uncomplicated  section    Interventions:   VS per protocol  EFM per protocol   I&O   Diego catheter  Abd prep with clippers as needed   SCD to bilateral lower extremities  NPO   Insert/maintain IV access   Antibiotics per protocol   Informed consent    - See additional Care Plan goals for specific interventions  Outcome: Completed  Goal: Patient/Family Short Term Goal  Description: Patient's Short Term Goal: pain management Interventions:   - taking PRN pain medication as needed  - repositioning  -using the abdominal binder  - See additional Care Plan goals for specific interventions  Outcome: Completed

## 2023-05-26 LAB
BASOPHILS # BLD AUTO: 0.04 X10(3) UL (ref 0–0.2)
BASOPHILS NFR BLD AUTO: 0.4 %
EOSINOPHIL # BLD AUTO: 0.04 X10(3) UL (ref 0–0.7)
EOSINOPHIL NFR BLD AUTO: 0.4 %
ERYTHROCYTE [DISTWIDTH] IN BLOOD BY AUTOMATED COUNT: 13.9 %
HCT VFR BLD AUTO: 35.5 %
HGB BLD-MCNC: 11.8 G/DL
IMM GRANULOCYTES # BLD AUTO: 0.04 X10(3) UL (ref 0–1)
IMM GRANULOCYTES NFR BLD: 0.4 %
LYMPHOCYTES # BLD AUTO: 1.91 X10(3) UL (ref 1–4)
LYMPHOCYTES NFR BLD AUTO: 19 %
MCH RBC QN AUTO: 32.4 PG (ref 26–34)
MCHC RBC AUTO-ENTMCNC: 33.2 G/DL (ref 31–37)
MCV RBC AUTO: 97.5 FL
MONOCYTES # BLD AUTO: 0.71 X10(3) UL (ref 0.1–1)
MONOCYTES NFR BLD AUTO: 7.1 %
NEUTROPHILS # BLD AUTO: 7.29 X10 (3) UL (ref 1.5–7.7)
NEUTROPHILS # BLD AUTO: 7.29 X10(3) UL (ref 1.5–7.7)
NEUTROPHILS NFR BLD AUTO: 72.7 %
PLATELET # BLD AUTO: 149 10(3)UL (ref 150–450)
RBC # BLD AUTO: 3.64 X10(6)UL
WBC # BLD AUTO: 10 X10(3) UL (ref 4–11)

## 2023-05-26 NOTE — PROGRESS NOTES
Walked into pt room and found mom sleeping with baby in her bed. Removed baby and placed baby safely in the bassinet and informed mother she cannot sleep with baby in her bed. Re-educated mother on safe sleep practices. Mother verbalized understanding.

## 2023-05-27 RX ORDER — IBUPROFEN 600 MG/1
600 TABLET ORAL EVERY 6 HOURS PRN
Qty: 30 TABLET | Refills: 0 | Status: SHIPPED | OUTPATIENT
Start: 2023-05-27

## 2023-05-27 RX ORDER — OXYCODONE HYDROCHLORIDE 5 MG/1
5 TABLET ORAL EVERY 6 HOURS PRN
Qty: 5 TABLET | Refills: 0 | Status: SHIPPED | OUTPATIENT
Start: 2023-05-27

## 2023-05-27 NOTE — LACTATION NOTE
This note was copied from a baby's chart. LACTATION NOTE - INFANT    Evaluation Type  Evaluation Type: Inpatient    Problems & Assessment  Problems Diagnosed or Identified: Shallow latch; feeding problem; Latch difficulty; Tongue restriction  Problems: comment/detail: Shallow latch d/t oral restriction  diagnosed by pediatrician)  low tongue with cupping on sides, anterior membrane visable causing pain/nipple trauma  Infant Assessment: Skin color: pink or appropriate for ethnicity;Good skin turgor  Muscle tone: Appropriate for GA    Feeding Assessment  Summary Current Feeding: PO U1jqoxc (Baby HAD been exclusivley breastfeeding- until 3 pm 2023 when very slight bleeding noted on bilateral nipples - Mom HepB+ and feeding plan changed to \"PUMP & DUMP\"  and formula feeding until nipples are healed. per CDC guidelines for HepB + mothers)  Last 24 hour feeding summary: BF 10 x's in 24 hrs. Baby HAD been exclusivley breastfeeding- until 3 pm 2023 when very slight bleeding noted on bilateral nipples - Mom HepB+ and feeding plan changed to \"PUMP & DUMP\"  and formula feeding until nipples are completely healed. per CDC guidelines for HepB + mothers. Detailed explaination to parents, both verbalized understanding-  Breastfeeding lasted # of minutes: 0 (temporary cessation of breastfeeding while maternal nipples are bleeding due to Maternal HepB+)  Other (comment): Declined interpretor- conversation verified by teach back method - Mother has very slight bleeding on nipples bilaterally - d/t HepB + status - cesasstion of latch attempts and offering the EBM to the baby reviewed and the CDC guidelines provided- ENc to pump to promote milk uspply/volume - discussed after the nipples have healed can latch, nipples to be examined for healing by a HCP ( OB, IBCLC, PCP). Mother and father of Natividad Led verbaliiize understaning ot why a temporary \"pump and dump\" is needed as HepB is bloodborne.   Hydrogels recommended and provided to promote quick healing. First baby also had a tongue tie, did not treat/revise. used a NS and BF for 7 mos. Recommended LC OPV for follow up, clearance from a HCP re: resuming latching/offering the EBM to baby to veriiify skin intact and no bleeding. Paced bottle feeding instructions and use of manual massage and expression in detail reviewed.

## 2023-05-28 VITALS
TEMPERATURE: 98 F | BODY MASS INDEX: 29.66 KG/M2 | DIASTOLIC BLOOD PRESSURE: 72 MMHG | HEART RATE: 59 BPM | RESPIRATION RATE: 16 BRPM | OXYGEN SATURATION: 98 % | HEIGHT: 65 IN | SYSTOLIC BLOOD PRESSURE: 106 MMHG | WEIGHT: 178 LBS

## 2023-05-28 NOTE — PROGRESS NOTES
Went over discharge instructions with patient. Patient understands she needs to go to her follow up appointment with Dr. Margo Boss on June 5th. Patient understands to pump and dump due to her Hep B status and bleeding nipples. Patient will follow up with lactation outpatient. Went over signs and symptoms of preeclampsia and encouraged to call her doctor with any concerns. Bands matched with infant. Kisses deactivated.

## 2023-05-28 NOTE — PLAN OF CARE
Problem: POSTPARTUM  Goal: Optimize infant feeding at the breast  Description: INTERVENTIONS:  - Initiate breast feeding within first hour after birth. - Monitor effectiveness of current breast feeding efforts. - Assess support systems available to mother/family.  - Identify cultural beliefs/practices regarding lactation, letdown techniques, maternal food preferences. - Assess mother's knowledge and previous experience with breast feeding.  - Provide information as needed about early infant feeding cues (e.g., rooting, lip smacking, sucking fingers/hand) versus late cue of crying.  - Discuss/demonstrate breast feeding aids (e.g., infant sling, nursing footstool/pillows, and breast pumps). - Encourage mother/other family members to express feelings/concerns, and actively listen. - Educate father/SO about benefits of breast feeding and how to manage common lactation challenges. - Recommend avoidance of specific medications or substances incompatible with breast feeding.  - Assess and monitor for signs of nipple pain/trauma. - Instruct and provide assistance with proper latch. - Review techniques for milk expression (breast pumping) and storage of breast milk. Provide pumping equipment/supplies, instructions and assistance, as needed. - Encourage rooming-in and breast feeding on demand.  - Encourage skin-to-skin contact. - Provide LC support as needed. - Assess for and manage engorgement. - Provide breast feeding education handouts and information on community breast feeding support. Outcome: Not Progressing  Note: Due to bleeding nipples and Hep + status, patient is solely pumping and dumping her milk until her nipples heal. Patient has a lactation outpatient appointment scheduled in 11 days to reassess nipples.

## 2023-05-30 ENCOUNTER — TELEPHONE (OUTPATIENT)
Dept: OBGYN UNIT | Facility: HOSPITAL | Age: 33
End: 2023-05-30

## 2023-05-30 ENCOUNTER — PATIENT MESSAGE (OUTPATIENT)
Dept: OBGYN CLINIC | Facility: CLINIC | Age: 33
End: 2023-05-30

## 2023-06-02 NOTE — TELEPHONE ENCOUNTER
From: Nasir Khan  To: Shruti Coe MD  Sent: 2023 12:37 PM CDT  Subject: Referral Request     Dear Dr. Roslyn Luciano,    I am writing to inform you that my  is experiencing some difficulties when it comes to latching on during breastfeeding. His pediatrician has scheduled a treatment today to address his tongue tie. We were advised to meet with a lactation consultant on , following our appointment with you on the . I kindly request a referral letter to the [address] [place] [address], located at the [address] [place], [place]. Please let me know if there is any additional information required from me in order to complete the referral.    Thank you very much in advance!     Sincerely,  Jake Barth

## 2023-06-03 PROBLEM — Z34.80 SUPERVISION OF OTHER NORMAL PREGNANCY, ANTEPARTUM: Status: RESOLVED | Noted: 2022-12-13 | Resolved: 2023-06-03

## 2023-06-03 PROBLEM — Z87.59 HISTORY OF OLIGOHYDRAMNIOS: Status: ACTIVE | Noted: 2023-05-12

## 2023-06-03 PROBLEM — Z34.90 PREGNANCY (HCC): Status: RESOLVED | Noted: 2023-05-25 | Resolved: 2023-06-03

## 2023-06-03 PROBLEM — Z34.80 SUPERVISION OF OTHER NORMAL PREGNANCY, ANTEPARTUM (HCC): Status: RESOLVED | Noted: 2022-12-13 | Resolved: 2023-06-03

## 2023-06-03 PROBLEM — O36.8390 FETAL ARRHYTHMIA AFFECTING PREGNANCY, ANTEPARTUM: Status: RESOLVED | Noted: 2023-04-19 | Resolved: 2023-06-03

## 2023-06-03 PROBLEM — O36.8390 FETAL ARRHYTHMIA AFFECTING PREGNANCY, ANTEPARTUM (HCC): Status: RESOLVED | Noted: 2023-04-19 | Resolved: 2023-06-03

## 2023-06-03 PROBLEM — O26.13 POOR WEIGHT GAIN OF PREGNANCY, THIRD TRIMESTER: Status: RESOLVED | Noted: 2023-04-06 | Resolved: 2023-06-03

## 2023-06-03 PROBLEM — O26.13: Status: RESOLVED | Noted: 2023-04-06 | Resolved: 2023-06-03

## 2023-06-03 PROBLEM — Z98.891 STATUS POST REPEAT LOW TRANSVERSE CESAREAN SECTION: Status: RESOLVED | Noted: 2023-05-25 | Resolved: 2023-06-03

## 2023-06-03 PROBLEM — Z86.32 HISTORY OF GESTATIONAL DIABETES: Status: ACTIVE | Noted: 2023-03-03

## 2023-06-03 PROBLEM — Z34.90 PREGNANCY: Status: RESOLVED | Noted: 2023-05-25 | Resolved: 2023-06-03

## 2023-06-05 ENCOUNTER — OFFICE VISIT (OUTPATIENT)
Dept: OBGYN CLINIC | Facility: CLINIC | Age: 33
End: 2023-06-05
Payer: COMMERCIAL

## 2023-06-05 VITALS
HEART RATE: 82 BPM | SYSTOLIC BLOOD PRESSURE: 104 MMHG | DIASTOLIC BLOOD PRESSURE: 64 MMHG | HEIGHT: 64 IN | WEIGHT: 125.38 LBS | BODY MASS INDEX: 21.4 KG/M2

## 2023-06-05 DIAGNOSIS — Z98.890 POST-OPERATIVE STATE: Primary | ICD-10-CM

## 2023-06-05 RX ORDER — DIAPER,BRIEF,INFANT-TODD,DISP
EACH MISCELLANEOUS 2 TIMES DAILY
COMMUNITY
Start: 2023-05-30 | End: 2023-06-06

## 2023-06-05 RX ORDER — FOLIC ACID 1 MG/1
1 TABLET ORAL DAILY
COMMUNITY

## 2023-06-08 ENCOUNTER — NURSE ONLY (OUTPATIENT)
Dept: LACTATION | Facility: HOSPITAL | Age: 33
End: 2023-06-08
Attending: OBSTETRICS & GYNECOLOGY
Payer: COMMERCIAL

## 2023-06-08 PROCEDURE — 99213 OFFICE O/P EST LOW 20 MIN: CPT

## 2023-07-10 ENCOUNTER — LAB ENCOUNTER (OUTPATIENT)
Dept: LAB | Age: 33
End: 2023-07-10
Attending: INTERNAL MEDICINE
Payer: COMMERCIAL

## 2023-07-10 ENCOUNTER — HOSPITAL ENCOUNTER (OUTPATIENT)
Dept: ULTRASOUND IMAGING | Age: 33
Discharge: HOME | End: 2023-07-10
Attending: INTERNAL MEDICINE
Payer: COMMERCIAL

## 2023-07-10 ENCOUNTER — PATIENT MESSAGE (OUTPATIENT)
Dept: OBGYN CLINIC | Facility: CLINIC | Age: 33
End: 2023-07-10

## 2023-07-10 ENCOUNTER — POSTPARTUM (OUTPATIENT)
Dept: OBGYN CLINIC | Facility: CLINIC | Age: 33
End: 2023-07-10
Payer: COMMERCIAL

## 2023-07-10 VITALS
SYSTOLIC BLOOD PRESSURE: 102 MMHG | HEART RATE: 75 BPM | WEIGHT: 125.13 LBS | BODY MASS INDEX: 21 KG/M2 | DIASTOLIC BLOOD PRESSURE: 68 MMHG

## 2023-07-10 DIAGNOSIS — B19.10 HEPATITIS B AFFECTING PREGNANCY: ICD-10-CM

## 2023-07-10 DIAGNOSIS — Z23 NEED FOR VACCINATION: ICD-10-CM

## 2023-07-10 DIAGNOSIS — O98.419 HEPATITIS B AFFECTING PREGNANCY: ICD-10-CM

## 2023-07-10 PROBLEM — Z86.32 HISTORY OF GESTATIONAL DIABETES: Status: RESOLVED | Noted: 2023-03-03 | Resolved: 2023-07-10

## 2023-07-10 LAB
ALBUMIN SERPL-MCNC: 4 G/DL (ref 3.4–5)
ALBUMIN/GLOB SERPL: 1.1 {RATIO} (ref 1–2)
ALP LIVER SERPL-CCNC: 68 U/L
ALT SERPL-CCNC: 64 U/L
ANION GAP SERPL CALC-SCNC: 6 MMOL/L (ref 0–18)
AST SERPL-CCNC: 32 U/L (ref 15–37)
BILIRUB SERPL-MCNC: 0.8 MG/DL (ref 0.1–2)
BUN BLD-MCNC: 14 MG/DL (ref 7–18)
CALCIUM BLD-MCNC: 8.7 MG/DL (ref 8.5–10.1)
CHLORIDE SERPL-SCNC: 109 MMOL/L (ref 98–112)
CO2 SERPL-SCNC: 25 MMOL/L (ref 21–32)
CREAT BLD-MCNC: 0.6 MG/DL
FASTING STATUS PATIENT QL REPORTED: YES
GFR SERPLBLD BASED ON 1.73 SQ M-ARVRAT: 123 ML/MIN/1.73M2 (ref 60–?)
GLOBULIN PLAS-MCNC: 3.5 G/DL (ref 2.8–4.4)
GLUCOSE BLD-MCNC: 70 MG/DL (ref 70–99)
OSMOLALITY SERPL CALC.SUM OF ELEC: 289 MOSM/KG (ref 275–295)
POTASSIUM SERPL-SCNC: 3.9 MMOL/L (ref 3.5–5.1)
PROT SERPL-MCNC: 7.5 G/DL (ref 6.4–8.2)
SODIUM SERPL-SCNC: 140 MMOL/L (ref 136–145)

## 2023-07-10 PROCEDURE — 80053 COMPREHEN METABOLIC PANEL: CPT

## 2023-07-10 PROCEDURE — 87517 HEPATITIS B DNA QUANT: CPT

## 2023-07-10 PROCEDURE — 76705 ECHO EXAM OF ABDOMEN: CPT | Performed by: INTERNAL MEDICINE

## 2023-07-10 NOTE — TELEPHONE ENCOUNTER
From: Deanna Valentine  To: DANICA Field  Sent: 7/10/2023 3:38 PM CDT  Subject: Request for glucose tolerance test     Dear Dr. Sotero Mota,    During the visit this afternoon, I forgot to mention the glucose tolerance test. Could you order it for me? I did some after-meal glucose tests after delivering my baby. Unfortunately, the results are sometimes high. Thank you in advance.     Miri Dejesus

## 2023-07-11 LAB
HBV IU/ML  2: 340 IU/ML
LOG10 HBV IU/ML: 2.53 LOG10 IU/ML

## 2023-07-17 ENCOUNTER — OFFICE VISIT (OUTPATIENT)
Dept: SURGERY | Facility: CLINIC | Age: 33
End: 2023-07-17

## 2023-07-17 VITALS
DIASTOLIC BLOOD PRESSURE: 60 MMHG | WEIGHT: 120 LBS | OXYGEN SATURATION: 99 % | HEART RATE: 61 BPM | RESPIRATION RATE: 14 BRPM | HEIGHT: 64 IN | SYSTOLIC BLOOD PRESSURE: 92 MMHG | BODY MASS INDEX: 20.49 KG/M2

## 2023-07-17 DIAGNOSIS — B18.1 CHRONIC VIRAL HEPATITIS B WITHOUT DELTA AGENT AND WITHOUT COMA (HCC): Primary | ICD-10-CM

## 2023-07-28 ENCOUNTER — OFFICE VISIT (OUTPATIENT)
Dept: FAMILY MEDICINE CLINIC | Facility: CLINIC | Age: 33
End: 2023-07-28
Payer: COMMERCIAL

## 2023-07-28 VITALS
HEIGHT: 64.76 IN | BODY MASS INDEX: 20.47 KG/M2 | TEMPERATURE: 99 F | DIASTOLIC BLOOD PRESSURE: 48 MMHG | RESPIRATION RATE: 16 BRPM | HEART RATE: 56 BPM | WEIGHT: 121.38 LBS | SYSTOLIC BLOOD PRESSURE: 98 MMHG

## 2023-07-28 DIAGNOSIS — Z86.32 HISTORY OF GESTATIONAL DIABETES: Primary | ICD-10-CM

## 2023-07-28 PROCEDURE — 3078F DIAST BP <80 MM HG: CPT | Performed by: STUDENT IN AN ORGANIZED HEALTH CARE EDUCATION/TRAINING PROGRAM

## 2023-07-28 PROCEDURE — 3008F BODY MASS INDEX DOCD: CPT | Performed by: STUDENT IN AN ORGANIZED HEALTH CARE EDUCATION/TRAINING PROGRAM

## 2023-07-28 PROCEDURE — 99214 OFFICE O/P EST MOD 30 MIN: CPT | Performed by: STUDENT IN AN ORGANIZED HEALTH CARE EDUCATION/TRAINING PROGRAM

## 2023-07-28 PROCEDURE — 3074F SYST BP LT 130 MM HG: CPT | Performed by: STUDENT IN AN ORGANIZED HEALTH CARE EDUCATION/TRAINING PROGRAM

## 2023-08-29 ENCOUNTER — LAB ENCOUNTER (OUTPATIENT)
Dept: LAB | Age: 33
End: 2023-08-29
Attending: STUDENT IN AN ORGANIZED HEALTH CARE EDUCATION/TRAINING PROGRAM
Payer: COMMERCIAL

## 2023-08-29 ENCOUNTER — PATIENT MESSAGE (OUTPATIENT)
Dept: FAMILY MEDICINE CLINIC | Facility: CLINIC | Age: 33
End: 2023-08-29

## 2023-08-29 DIAGNOSIS — Z86.32 HISTORY OF GESTATIONAL DIABETES: ICD-10-CM

## 2023-08-29 DIAGNOSIS — B18.1 CHRONIC VIRAL HEPATITIS B WITHOUT DELTA AGENT AND WITHOUT COMA (HCC): ICD-10-CM

## 2023-08-29 LAB
ALBUMIN SERPL-MCNC: 4 G/DL (ref 3.4–5)
ALP LIVER SERPL-CCNC: 76 U/L
ALT SERPL-CCNC: 39 U/L
AST SERPL-CCNC: 18 U/L (ref 15–37)
BILIRUB DIRECT SERPL-MCNC: 0.2 MG/DL (ref 0–0.2)
BILIRUB SERPL-MCNC: 0.8 MG/DL (ref 0.1–2)
GLUCOSE 1H P GLC SERPL-MCNC: 163 MG/DL
GLUCOSE P FAST SERPL-MCNC: 78 MG/DL (ref 70–99)
PROT SERPL-MCNC: 7.8 G/DL (ref 6.4–8.2)

## 2023-08-29 PROCEDURE — 82950 GLUCOSE TEST: CPT

## 2023-08-29 PROCEDURE — 87517 HEPATITIS B DNA QUANT: CPT

## 2023-08-29 PROCEDURE — 80076 HEPATIC FUNCTION PANEL: CPT

## 2023-08-29 PROCEDURE — 86692 HEPATITIS DELTA AGENT ANTBDY: CPT

## 2023-08-29 PROCEDURE — 82947 ASSAY GLUCOSE BLOOD QUANT: CPT

## 2023-08-30 DIAGNOSIS — R73.09 ABNORMAL GLUCOSE TOLERANCE TEST (GTT): Primary | ICD-10-CM

## 2023-08-30 LAB
HBV IU/ML  2: 340 IU/ML
LOG10 HBV IU/ML: 2.53 LOG10 IU/ML

## 2023-09-02 LAB — HEP D TOTAL AB: NEGATIVE

## 2023-09-14 ENCOUNTER — NURSE ONLY (OUTPATIENT)
Dept: FAMILY MEDICINE CLINIC | Facility: CLINIC | Age: 33
End: 2023-09-14
Payer: COMMERCIAL

## 2023-09-14 DIAGNOSIS — Z23 NEED FOR VACCINATION: Primary | ICD-10-CM

## 2023-09-14 PROCEDURE — 90471 IMMUNIZATION ADMIN: CPT | Performed by: STUDENT IN AN ORGANIZED HEALTH CARE EDUCATION/TRAINING PROGRAM

## 2023-09-14 PROCEDURE — 90651 9VHPV VACCINE 2/3 DOSE IM: CPT | Performed by: STUDENT IN AN ORGANIZED HEALTH CARE EDUCATION/TRAINING PROGRAM

## 2023-11-06 ENCOUNTER — LAB ENCOUNTER (OUTPATIENT)
Dept: LAB | Age: 33
End: 2023-11-06
Attending: STUDENT IN AN ORGANIZED HEALTH CARE EDUCATION/TRAINING PROGRAM
Payer: COMMERCIAL

## 2023-11-06 DIAGNOSIS — R73.09 ABNORMAL GLUCOSE TOLERANCE TEST (GTT): ICD-10-CM

## 2023-11-06 LAB
EST. AVERAGE GLUCOSE BLD GHB EST-MCNC: 117 MG/DL (ref 68–126)
HBA1C MFR BLD: 5.7 % (ref ?–5.7)

## 2023-11-06 PROCEDURE — 83036 HEMOGLOBIN GLYCOSYLATED A1C: CPT

## 2024-01-09 ENCOUNTER — TELEPHONE (OUTPATIENT)
Dept: FAMILY MEDICINE CLINIC | Facility: CLINIC | Age: 34
End: 2024-01-09

## 2024-01-09 DIAGNOSIS — Z23 NEED FOR VACCINATION: Primary | ICD-10-CM

## 2024-01-10 NOTE — TELEPHONE ENCOUNTER
HPV will be due on/after 1/14/24 because it has to be 6 months after the initial dose. Please re-schedule patient. Thank you.

## 2024-01-11 ENCOUNTER — TELEPHONE (OUTPATIENT)
Dept: FAMILY MEDICINE CLINIC | Facility: CLINIC | Age: 34
End: 2024-01-11

## 2024-01-11 DIAGNOSIS — B18.1 CHRONIC HEPATITIS B (HCC): Primary | ICD-10-CM

## 2024-01-15 ENCOUNTER — TELEPHONE (OUTPATIENT)
Dept: FAMILY MEDICINE CLINIC | Facility: CLINIC | Age: 34
End: 2024-01-15

## 2024-01-16 ENCOUNTER — NURSE ONLY (OUTPATIENT)
Dept: FAMILY MEDICINE CLINIC | Facility: CLINIC | Age: 34
End: 2024-01-16
Payer: COMMERCIAL

## 2024-01-16 DIAGNOSIS — Z23 NEED FOR VACCINATION: ICD-10-CM

## 2024-01-16 PROCEDURE — 90651 9VHPV VACCINE 2/3 DOSE IM: CPT | Performed by: STUDENT IN AN ORGANIZED HEALTH CARE EDUCATION/TRAINING PROGRAM

## 2024-01-16 PROCEDURE — 90471 IMMUNIZATION ADMIN: CPT | Performed by: STUDENT IN AN ORGANIZED HEALTH CARE EDUCATION/TRAINING PROGRAM

## 2024-01-16 NOTE — PROGRESS NOTES
Pt presents for # 3 HPV  injection.  Pt reports no side effects or adverse reactions with previous HPV administrations.   Reinforced possible side effects and conservative management measures, as needed.   Advised to contact ofc and speak with triage nurse or provider on call if any severe reactions.   Patient voices understanding, no additional questions.        Injection given in Left deltoid without difficulty and tolerated well by pt.

## 2024-01-17 ENCOUNTER — MED REC SCAN ONLY (OUTPATIENT)
Dept: FAMILY MEDICINE CLINIC | Facility: CLINIC | Age: 34
End: 2024-01-17

## 2024-01-26 ENCOUNTER — LAB ENCOUNTER (OUTPATIENT)
Dept: LAB | Age: 34
End: 2024-01-26
Attending: INTERNAL MEDICINE
Payer: COMMERCIAL

## 2024-01-26 DIAGNOSIS — B18.1 CHRONIC VIRAL HEPATITIS B WITHOUT DELTA AGENT AND WITHOUT COMA (HCC): ICD-10-CM

## 2024-01-26 LAB
AFP-TM SERPL-MCNC: 3.2 NG/ML (ref ?–8)
ALBUMIN SERPL-MCNC: 4.1 G/DL (ref 3.4–5)
ALP LIVER SERPL-CCNC: 73 U/L
AST SERPL-CCNC: 10 U/L (ref 15–37)
BILIRUB DIRECT SERPL-MCNC: 0.2 MG/DL (ref 0–0.2)
BILIRUB SERPL-MCNC: 0.6 MG/DL (ref 0.1–2)
PROT SERPL-MCNC: 7.6 G/DL (ref 6.4–8.2)

## 2024-01-26 PROCEDURE — 82105 ALPHA-FETOPROTEIN SERUM: CPT

## 2024-01-26 PROCEDURE — 87517 HEPATITIS B DNA QUANT: CPT

## 2024-01-26 PROCEDURE — 80076 HEPATIC FUNCTION PANEL: CPT

## 2024-01-27 LAB
HBV IU/ML  2: 40 IU/ML
LOG10 HBV IU/ML: 1.6 LOG10 IU/ML

## 2024-02-05 ENCOUNTER — OFFICE VISIT (OUTPATIENT)
Dept: SURGERY | Facility: CLINIC | Age: 34
End: 2024-02-05

## 2024-02-05 VITALS
OXYGEN SATURATION: 97 % | WEIGHT: 113.81 LBS | HEART RATE: 81 BPM | TEMPERATURE: 98 F | BODY MASS INDEX: 19 KG/M2 | SYSTOLIC BLOOD PRESSURE: 99 MMHG | RESPIRATION RATE: 16 BRPM | DIASTOLIC BLOOD PRESSURE: 67 MMHG

## 2024-02-05 DIAGNOSIS — B18.1 CHRONIC HEPATITIS B (HCC): Primary | ICD-10-CM

## 2024-02-08 ENCOUNTER — MED REC SCAN ONLY (OUTPATIENT)
Dept: FAMILY MEDICINE CLINIC | Facility: CLINIC | Age: 34
End: 2024-02-08

## 2024-03-04 ENCOUNTER — MED REC SCAN ONLY (OUTPATIENT)
Dept: FAMILY MEDICINE CLINIC | Facility: CLINIC | Age: 34
End: 2024-03-04

## 2024-03-15 ENCOUNTER — HOSPITAL ENCOUNTER (OUTPATIENT)
Dept: GENERAL RADIOLOGY | Age: 34
Discharge: HOME | End: 2024-03-15
Attending: STUDENT IN AN ORGANIZED HEALTH CARE EDUCATION/TRAINING PROGRAM
Payer: COMMERCIAL

## 2024-03-15 ENCOUNTER — OFFICE VISIT (OUTPATIENT)
Dept: FAMILY MEDICINE CLINIC | Facility: CLINIC | Age: 34
End: 2024-03-15
Payer: COMMERCIAL

## 2024-03-15 ENCOUNTER — LAB ENCOUNTER (OUTPATIENT)
Dept: LAB | Age: 34
End: 2024-03-15
Attending: STUDENT IN AN ORGANIZED HEALTH CARE EDUCATION/TRAINING PROGRAM
Payer: COMMERCIAL

## 2024-03-15 VITALS
WEIGHT: 116.63 LBS | TEMPERATURE: 97 F | HEIGHT: 64.76 IN | HEART RATE: 78 BPM | BODY MASS INDEX: 19.67 KG/M2 | RESPIRATION RATE: 16 BRPM | SYSTOLIC BLOOD PRESSURE: 90 MMHG | DIASTOLIC BLOOD PRESSURE: 58 MMHG

## 2024-03-15 DIAGNOSIS — Z00.00 WELLNESS EXAMINATION: ICD-10-CM

## 2024-03-15 DIAGNOSIS — G89.29 CHRONIC BILATERAL LOW BACK PAIN WITHOUT SCIATICA: ICD-10-CM

## 2024-03-15 DIAGNOSIS — R06.89 DIFFICULTY BREATHING: ICD-10-CM

## 2024-03-15 DIAGNOSIS — Z13.89 SCREENING FOR GENITOURINARY CONDITION: ICD-10-CM

## 2024-03-15 DIAGNOSIS — Z00.00 WELLNESS EXAMINATION: Primary | ICD-10-CM

## 2024-03-15 DIAGNOSIS — Z86.32 HISTORY OF GESTATIONAL DIABETES: ICD-10-CM

## 2024-03-15 DIAGNOSIS — U09.9 LONG COVID: ICD-10-CM

## 2024-03-15 DIAGNOSIS — Z13.29 SCREENING FOR THYROID DISORDER: ICD-10-CM

## 2024-03-15 DIAGNOSIS — M54.50 CHRONIC BILATERAL LOW BACK PAIN WITHOUT SCIATICA: ICD-10-CM

## 2024-03-15 LAB
ALBUMIN SERPL-MCNC: 4.2 G/DL (ref 3.4–5)
ALBUMIN/GLOB SERPL: 1.1 {RATIO} (ref 1–2)
ALP LIVER SERPL-CCNC: 78 U/L
ALT SERPL-CCNC: 27 U/L
ANION GAP SERPL CALC-SCNC: 4 MMOL/L (ref 0–18)
AST SERPL-CCNC: 16 U/L (ref 15–37)
BASOPHILS # BLD AUTO: 0.05 X10(3) UL (ref 0–0.2)
BASOPHILS NFR BLD AUTO: 0.7 %
BILIRUB SERPL-MCNC: 0.5 MG/DL (ref 0.1–2)
BILIRUB UR QL STRIP.AUTO: NEGATIVE
BUN BLD-MCNC: 14 MG/DL (ref 9–23)
CALCIUM BLD-MCNC: 9.1 MG/DL (ref 8.5–10.1)
CHLORIDE SERPL-SCNC: 104 MMOL/L (ref 98–112)
CHOLEST SERPL-MCNC: 206 MG/DL (ref ?–200)
CLARITY UR REFRACT.AUTO: CLEAR
CO2 SERPL-SCNC: 28 MMOL/L (ref 21–32)
CREAT BLD-MCNC: 0.86 MG/DL
EGFRCR SERPLBLD CKD-EPI 2021: 92 ML/MIN/1.73M2 (ref 60–?)
EOSINOPHIL # BLD AUTO: 0.06 X10(3) UL (ref 0–0.7)
EOSINOPHIL NFR BLD AUTO: 0.8 %
ERYTHROCYTE [DISTWIDTH] IN BLOOD BY AUTOMATED COUNT: 12.5 %
EST. AVERAGE GLUCOSE BLD GHB EST-MCNC: 111 MG/DL (ref 68–126)
FASTING PATIENT LIPID ANSWER: NO
FASTING STATUS PATIENT QL REPORTED: NO
GLOBULIN PLAS-MCNC: 3.8 G/DL (ref 2.8–4.4)
GLUCOSE BLD-MCNC: 115 MG/DL (ref 70–99)
GLUCOSE UR STRIP.AUTO-MCNC: NORMAL MG/DL
HBA1C MFR BLD: 5.5 % (ref ?–5.7)
HCT VFR BLD AUTO: 43.4 %
HDLC SERPL-MCNC: 72 MG/DL (ref 40–59)
HGB BLD-MCNC: 14.5 G/DL
IMM GRANULOCYTES # BLD AUTO: 0.02 X10(3) UL (ref 0–1)
IMM GRANULOCYTES NFR BLD: 0.3 %
KETONES UR STRIP.AUTO-MCNC: NEGATIVE MG/DL
LDLC SERPL CALC-MCNC: 114 MG/DL (ref ?–100)
LEUKOCYTE ESTERASE UR QL STRIP.AUTO: NEGATIVE
LYMPHOCYTES # BLD AUTO: 2.55 X10(3) UL (ref 1–4)
LYMPHOCYTES NFR BLD AUTO: 34.8 %
MCH RBC QN AUTO: 30.7 PG (ref 26–34)
MCHC RBC AUTO-ENTMCNC: 33.4 G/DL (ref 31–37)
MCV RBC AUTO: 91.8 FL
MONOCYTES # BLD AUTO: 0.38 X10(3) UL (ref 0.1–1)
MONOCYTES NFR BLD AUTO: 5.2 %
NEUTROPHILS # BLD AUTO: 4.26 X10 (3) UL (ref 1.5–7.7)
NEUTROPHILS # BLD AUTO: 4.26 X10(3) UL (ref 1.5–7.7)
NEUTROPHILS NFR BLD AUTO: 58.2 %
NITRITE UR QL STRIP.AUTO: NEGATIVE
NONHDLC SERPL-MCNC: 134 MG/DL (ref ?–130)
OSMOLALITY SERPL CALC.SUM OF ELEC: 283 MOSM/KG (ref 275–295)
PH UR STRIP.AUTO: 6 [PH] (ref 5–8)
PLATELET # BLD AUTO: 267 10(3)UL (ref 150–450)
POTASSIUM SERPL-SCNC: 3.7 MMOL/L (ref 3.5–5.1)
PROT SERPL-MCNC: 8 G/DL (ref 6.4–8.2)
PROT UR STRIP.AUTO-MCNC: NEGATIVE MG/DL
RBC # BLD AUTO: 4.73 X10(6)UL
RBC UR QL AUTO: NEGATIVE
SODIUM SERPL-SCNC: 136 MMOL/L (ref 136–145)
SP GR UR STRIP.AUTO: 1.02 (ref 1–1.03)
TRIGL SERPL-MCNC: 117 MG/DL (ref 30–149)
TSI SER-ACNC: 0.97 MIU/ML (ref 0.36–3.74)
UROBILINOGEN UR STRIP.AUTO-MCNC: NORMAL MG/DL
VLDLC SERPL CALC-MCNC: 20 MG/DL (ref 0–30)
WBC # BLD AUTO: 7.3 X10(3) UL (ref 4–11)

## 2024-03-15 PROCEDURE — 80053 COMPREHEN METABOLIC PANEL: CPT

## 2024-03-15 PROCEDURE — 83036 HEMOGLOBIN GLYCOSYLATED A1C: CPT

## 2024-03-15 PROCEDURE — 84443 ASSAY THYROID STIM HORMONE: CPT

## 2024-03-15 PROCEDURE — 80061 LIPID PANEL: CPT

## 2024-03-15 PROCEDURE — 72110 X-RAY EXAM L-2 SPINE 4/>VWS: CPT | Performed by: STUDENT IN AN ORGANIZED HEALTH CARE EDUCATION/TRAINING PROGRAM

## 2024-03-15 PROCEDURE — 85025 COMPLETE CBC W/AUTO DIFF WBC: CPT

## 2024-03-15 PROCEDURE — 99395 PREV VISIT EST AGE 18-39: CPT | Performed by: STUDENT IN AN ORGANIZED HEALTH CARE EDUCATION/TRAINING PROGRAM

## 2024-03-15 PROCEDURE — 99213 OFFICE O/P EST LOW 20 MIN: CPT | Performed by: STUDENT IN AN ORGANIZED HEALTH CARE EDUCATION/TRAINING PROGRAM

## 2024-03-15 PROCEDURE — 81003 URINALYSIS AUTO W/O SCOPE: CPT

## 2024-03-15 RX ORDER — ALBUTEROL SULFATE 90 UG/1
1-2 AEROSOL, METERED RESPIRATORY (INHALATION)
COMMUNITY
Start: 2024-02-27 | End: 2024-03-28

## 2024-03-15 NOTE — PROGRESS NOTES
[facility]  03/15/24    Chief Complaint   Patient presents with    Physical     HPI:   Leyla Huggins is a 34 year old female who presents for a complete physical exam.     Her in-laws came a few weeks ago to help take care of her two children. Daughter is 3 and 9 months, son is 9.5 months. Not breastfeeding, stopped 3-4  weeks ago.    Patient's last menstrual period was 01/08/2024. Has not had period return after stopping breastfeeding.  Not feeling pregnant.    After having first child, took care of daughter all the time. Low back started hurts. Sometimes will flare up. Back pain started up again. Worse when bending over for a while then standing up it will hurt. No shotting pain down the legs. More difficult to get up when more on her left side, has to tilt more to the right side to get up.     Beginning of January 2024 everyone had covid. Had cough for 40 days. Occasional forceful cough.     End of February 2024 daughter was sick from . Patient felt lit was like asthma and couldn't breathe as well. It lasted 5-10 minutes at a time. Tried claritin and felt a lot better.     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  Immunizations: up to date  Screenings: up to date    Wt Readings from Last 6 Encounters:   03/15/24 116 lb 9.6 oz (52.9 kg)   02/05/24 113 lb 12.8 oz (51.6 kg)   07/28/23 121 lb 6.4 oz (55.1 kg)   07/17/23 120 lb (54.4 kg)   07/10/23 125 lb 2 oz (56.8 kg)   06/05/23 125 lb 6.4 oz (56.9 kg)     Body mass index is 19.55 kg/m².     Results for orders placed or performed in visit on 01/26/24   Hepatitis B DNA, Quantitative, RT-PCR   Result Value Ref Range    HBV IU/mL 40 IU/mL    log10 HBV IU/mL 1.602 log10 IU/mL    HBV Test  Information Comment    AFP, Tumor Marker, Serum   Result Value Ref Range    AFP Tumor Marker 3.2 <8.0 ng/mL   Hepatic Function Panel (7)   Result Value Ref Range    AST 10 (L) 15 - 37 U/L    ALT      Alkaline Phosphatase 73 37 - 98 U/L    Bilirubin, Total 0.6 0.1 - 2.0 mg/dL    Bilirubin, Direct 0.2 0.0 - 0.2 mg/dL    Total Protein 7.6 6.4 - 8.2 g/dL    Albumin 4.1 3.4 - 5.0 g/dL       Current Outpatient Medications   Medication Sig Dispense Refill    albuterol 108 (90 Base) MCG/ACT Inhalation Aero Soln Inhale 1-2 puffs into the lungs every 4 to 6 hours as needed.        Allergies   Allergen Reactions    Adhesive Tape RASH      Past Medical History:   Diagnosis Date    Gestational diabetes (HCC)     Hepatitis B       Past Surgical History:   Procedure Laterality Date            Family History   Problem Relation Age of Onset    Diabetes Mother     Hypertension Father     Other (Hepatitis B) Father     Diabetes Maternal Grandmother     Cancer Maternal Grandfather       Social History:   Social History     Socioeconomic History    Marital status:    Tobacco Use    Smoking status: Never     Passive exposure: Never    Smokeless tobacco: Never   Vaping Use    Vaping Use: Never used   Substance and Sexual Activity    Alcohol use: Never    Drug use: Never    Sexual activity: Not Currently     Partners: Male   Other Topics Concern    Caffeine Concern Yes     Comment: 2-3 c. coffee/week    Stress Concern No    Weight Concern No    Special Diet No    Exercise Yes     Comment: walking    Seat Belt Yes     Social Determinants of Health     Financial Resource Strain: Unknown (3/4/2023)    Financial Resource Strain     Difficulty of Paying Living Expenses: Patient declined     Med Affordability: No   Food Insecurity: No Food Insecurity (3/4/2023)    Food Insecurity     Food Insecurity: Never true   Transportation Needs: No Transportation Needs (3/4/2023)    Transportation Needs     Lack of Transportation: No    Stress: No Stress Concern Present (3/4/2023)    Stress     Feeling of Stress : No   Housing Stability: Low Risk  (3/4/2023)    Housing Stability     Housing Instability: No        REVIEW OF SYSTEMS:   GENERAL: feels well otherwise  SKIN: denies any unusual skin lesions  EYES:denies blurred vision or double vision  HEENT: denies nasal congestion, sinus pain or ST  LUNGS: see HPI  CARDIOVASCULAR: denies chest pain on exertion or at rest, denies palpitations  GI: denies abdominal pain,denies heartburn, denies n/v/d/c/blood in stool  : denies dysuria, vaginal discharge or itching,periods regular   MUSCULOSKELETAL: see HPI  NEURO: denies headaches, denies LH/dizziness/syncope  PSYCHE: denies depression or anxiety  HEMATOLOGIC: denies hx of anemia  ENDOCRINE: denies thyroid history  ALL/ASTHMA: denies hx of allergy or asthma    EXAM:   BP 90/58 (BP Location: Right arm, Patient Position: Sitting, Cuff Size: adult)   Pulse 78   Temp 97 °F (36.1 °C) (Temporal)   Resp 16   Ht 5' 4.76\" (1.645 m)   Wt 116 lb 9.6 oz (52.9 kg)   LMP 01/08/2024   Breastfeeding No   BMI 19.55 kg/m²   Body mass index is 19.55 kg/m².   GENERAL: well developed, well nourished,in no apparent distress  SKIN: no rash  HEENT: atraumatic, normocephalic,ears and throat are clear  EYES:PERRLA, EOMI,conjunctiva are clear  NECK: supple,no adenopathy,no thyromegaly  BREAST: deferred to gynecology  LUNGS: clear to auscultation; no rhonchi, rales, or wheezing  CARDIO: RRR without murmur  GI: good BS's, no masses, HSM or tenderness  : deferred to gynecology  MUSCULOSKELETAL: FROM of the back, + tenderness of lumbar spine and hypertonicity of paraspinals b/l, negative SLR b/l  EXTREMITIES: no cyanosis, clubbing or edema  NEURO: Oriented times three,cranial nerves are intact,motor and sensory are grossly intact; 2+ knee reflexes bilaterally  VASCULAR: 2+ posterior tibial pulses bilaterally    ASSESSMENT AND PLAN:   Leyla Huggins is a 34 year old female who  presents for a complete physical exam.     1. Wellness examination  - Pap and pelvic deferred to gynecology. Last pap: 11/14/2022.   - BP: at goal  - Pt' s weight is Body mass index is 19.55 kg/m²., normal, recommended low fat diet and aerobic exercise 30 minutes at least three times weekly and strength training /resistance training at least twice weekly.  - CBC With Differential With Platelet; Future  - Comp Metabolic Panel (14); Future  - Lipid Panel; Future  - TSH W Reflex To Free T4; Future  - Urinalysis with Culture Reflex; Future  - Hemoglobin A1C [E]; Future    2. Screening for genitourinary condition  - Urinalysis with Culture Reflex; Future    3. Screening for thyroid disorder  - TSH W Reflex To Free T4; Future    4. History of gestational diabetes  Will check A1c  - Hemoglobin A1C [E]; Future    5. Chronic bilateral low back pain without sciatica  Pt presents with chronic low back pain for years  - differential diagnosis includes muscle sprain, disc herniation, degenerative disc disease  - avoid bed rest  - gentle stretching  - activity as tolerated  - will check XR then consider physical therapy  - provided exercises for Low back pain from sports medicine patient advisor  - XR LUMBAR SPINE (MIN 4 VIEWS) (CPT=72110); Future    6. Difficulty breathing  Patient reports difficulty breathing after having had covid-19. Improved with albuterol and claritin. Will check PFT to assess for asthma, restrictive or obstructive lung disease.  - Complete PFT; Future    7. Long COVID  - Complete PFT; Future          The patient indicates understanding of these issues and agrees to the plan.      Health maintenance, will check:   Orders Placed This Encounter   Procedures    CBC With Differential With Platelet    Comp Metabolic Panel (14)    Lipid Panel    TSH W Reflex To Free T4    Urinalysis with Culture Reflex    Hemoglobin A1C [E]           Encounter Diagnoses   Name Primary?    Wellness examination Yes    Screening for  genitourinary condition     Screening for thyroid disorder     History of gestational diabetes     Chronic bilateral low back pain without sciatica     Difficulty breathing     Long COVID        Meds & Refills for this Visit:  Requested Prescriptions      No prescriptions requested or ordered in this encounter       Imaging & Consults:  None      No follow-ups on file.      Nahomi Reyes MD  [facility]  03/15/24

## 2024-03-16 DIAGNOSIS — M47.816 FACET ARTHRITIS, DEGENERATIVE, LUMBAR SPINE: ICD-10-CM

## 2024-03-16 DIAGNOSIS — M51.36 LUMBAR DEGENERATIVE DISC DISEASE: Primary | ICD-10-CM

## 2024-03-16 PROBLEM — M51.369 LUMBAR DEGENERATIVE DISC DISEASE: Status: ACTIVE | Noted: 2024-03-16

## 2024-07-24 ENCOUNTER — HOSPITAL ENCOUNTER (OUTPATIENT)
Dept: ULTRASOUND IMAGING | Age: 34
Discharge: HOME | End: 2024-07-24
Attending: INTERNAL MEDICINE
Payer: COMMERCIAL

## 2024-07-24 DIAGNOSIS — B18.1 CHRONIC HEPATITIS B (HCC): ICD-10-CM

## 2024-07-24 PROCEDURE — 76700 US EXAM ABDOM COMPLETE: CPT | Performed by: INTERNAL MEDICINE

## 2024-07-26 ENCOUNTER — LAB ENCOUNTER (OUTPATIENT)
Dept: LAB | Age: 34
End: 2024-07-26
Attending: INTERNAL MEDICINE
Payer: COMMERCIAL

## 2024-07-26 DIAGNOSIS — B18.1 CHRONIC HEPATITIS B (HCC): ICD-10-CM

## 2024-07-26 LAB
ALBUMIN SERPL-MCNC: 4.5 G/DL (ref 3.2–4.8)
ALP LIVER SERPL-CCNC: 54 U/L
ALT SERPL-CCNC: 20 U/L
AST SERPL-CCNC: 18 U/L (ref ?–34)
BILIRUB DIRECT SERPL-MCNC: 0.2 MG/DL (ref ?–0.3)
BILIRUB SERPL-MCNC: 0.6 MG/DL (ref 0.3–1.2)
PROT SERPL-MCNC: 7.4 G/DL (ref 5.7–8.2)

## 2024-07-26 PROCEDURE — 87517 HEPATITIS B DNA QUANT: CPT

## 2024-07-26 PROCEDURE — 80076 HEPATIC FUNCTION PANEL: CPT

## 2024-07-29 LAB
HBV DNA DETECT, QN LOG: 2.58 {LOG_IU}/ML
HBV DNA DETECT, QN: 376 [IU]/ML

## 2024-08-05 ENCOUNTER — OFFICE VISIT (OUTPATIENT)
Dept: SURGERY | Facility: CLINIC | Age: 34
End: 2024-08-05

## 2024-08-05 VITALS
WEIGHT: 116.63 LBS | RESPIRATION RATE: 16 BRPM | HEART RATE: 82 BPM | DIASTOLIC BLOOD PRESSURE: 64 MMHG | OXYGEN SATURATION: 96 % | SYSTOLIC BLOOD PRESSURE: 100 MMHG | BODY MASS INDEX: 20 KG/M2 | TEMPERATURE: 99 F

## 2024-08-05 DIAGNOSIS — B18.1 CHRONIC HEPATITIS B (HCC): Primary | ICD-10-CM

## 2025-03-27 ENCOUNTER — TELEPHONE (OUTPATIENT)
Dept: FAMILY MEDICINE CLINIC | Facility: CLINIC | Age: 35
End: 2025-03-27

## 2025-03-27 DIAGNOSIS — Z00.00 LABORATORY EXAM ORDERED AS PART OF ROUTINE GENERAL MEDICAL EXAMINATION: Primary | ICD-10-CM

## 2025-03-27 DIAGNOSIS — B18.1 CHRONIC HEPATITIS B WITHOUT DELTA AGENT WITHOUT HEPATIC COMA (HCC): ICD-10-CM

## 2025-03-27 DIAGNOSIS — Z86.32 HISTORY OF GESTATIONAL DIABETES: ICD-10-CM

## 2025-03-28 NOTE — TELEPHONE ENCOUNTER
Patient called back and informed of the lab orders.  Per patient she has an appoint with her hepatologist in June or July and will have results fax to them then.   Doxepin Pregnancy And Lactation Text: This medication is Pregnancy Category C and it isn't known if it is safe during pregnancy. It is also excreted in breast milk and breast feeding isn't recommended.

## 2025-03-28 NOTE — TELEPHONE ENCOUNTER
Hepatic function panel is part of the CMP.  Is patient following up with hepatology? I'm going to attach them to the result as well.  Thank you.

## 2025-03-28 NOTE — TELEPHONE ENCOUNTER
Patient scheduled with NP for physical on 4/21. Patient is requesting if hepatitis B DNA and hepatic function panel can be ordered as well. Please see pended. Thank you.

## 2025-04-12 ENCOUNTER — TELEPHONE (OUTPATIENT)
Dept: FAMILY MEDICINE CLINIC | Facility: CLINIC | Age: 35
End: 2025-04-12

## 2025-04-12 ENCOUNTER — MED REC SCAN ONLY (OUTPATIENT)
Dept: FAMILY MEDICINE CLINIC | Facility: CLINIC | Age: 35
End: 2025-04-12

## 2025-04-12 NOTE — TELEPHONE ENCOUNTER
Medical Records Request received from [facility] for records from All medical records.     Release original sent to [employer] on 4/12/25. Copy sent to scanning.

## 2025-04-14 ENCOUNTER — LAB ENCOUNTER (OUTPATIENT)
Dept: LAB | Age: 35
End: 2025-04-14
Attending: STUDENT IN AN ORGANIZED HEALTH CARE EDUCATION/TRAINING PROGRAM
Payer: COMMERCIAL

## 2025-04-14 DIAGNOSIS — Z86.32 HISTORY OF GESTATIONAL DIABETES: ICD-10-CM

## 2025-04-14 DIAGNOSIS — Z00.00 LABORATORY EXAM ORDERED AS PART OF ROUTINE GENERAL MEDICAL EXAMINATION: ICD-10-CM

## 2025-04-14 DIAGNOSIS — B18.1 CHRONIC HEPATITIS B WITHOUT DELTA AGENT WITHOUT HEPATIC COMA (HCC): ICD-10-CM

## 2025-04-14 LAB
ALBUMIN SERPL-MCNC: 4.8 G/DL (ref 3.2–4.8)
ALBUMIN/GLOB SERPL: 1.8 {RATIO} (ref 1–2)
ALP LIVER SERPL-CCNC: 44 U/L (ref 37–98)
ALT SERPL-CCNC: 18 U/L (ref 10–49)
ANION GAP SERPL CALC-SCNC: 7 MMOL/L (ref 0–18)
AST SERPL-CCNC: 16 U/L (ref ?–34)
BASOPHILS # BLD AUTO: 0.06 X10(3) UL (ref 0–0.2)
BASOPHILS NFR BLD AUTO: 0.8 %
BILIRUB SERPL-MCNC: 0.8 MG/DL (ref 0.3–1.2)
BILIRUB UR QL STRIP.AUTO: NEGATIVE
BUN BLD-MCNC: 10 MG/DL (ref 9–23)
CALCIUM BLD-MCNC: 9.9 MG/DL (ref 8.7–10.6)
CHLORIDE SERPL-SCNC: 102 MMOL/L (ref 98–112)
CHOLEST SERPL-MCNC: 200 MG/DL (ref ?–200)
CLARITY UR REFRACT.AUTO: CLEAR
CO2 SERPL-SCNC: 31 MMOL/L (ref 21–32)
COLOR UR AUTO: COLORLESS
CREAT BLD-MCNC: 0.68 MG/DL (ref 0.55–1.02)
EGFRCR SERPLBLD CKD-EPI 2021: 118 ML/MIN/1.73M2 (ref 60–?)
EOSINOPHIL # BLD AUTO: 0.03 X10(3) UL (ref 0–0.7)
EOSINOPHIL NFR BLD AUTO: 0.4 %
ERYTHROCYTE [DISTWIDTH] IN BLOOD BY AUTOMATED COUNT: 12.6 %
EST. AVERAGE GLUCOSE BLD GHB EST-MCNC: 105 MG/DL (ref 68–126)
FASTING PATIENT LIPID ANSWER: NO
FASTING STATUS PATIENT QL REPORTED: NO
GLOBULIN PLAS-MCNC: 2.6 G/DL (ref 2–3.5)
GLUCOSE BLD-MCNC: 83 MG/DL (ref 70–99)
GLUCOSE UR STRIP.AUTO-MCNC: NORMAL MG/DL
HBA1C MFR BLD: 5.3 % (ref ?–5.7)
HCT VFR BLD AUTO: 41.1 % (ref 35–48)
HDLC SERPL-MCNC: 67 MG/DL (ref 40–59)
HGB BLD-MCNC: 13.6 G/DL (ref 12–16)
IMM GRANULOCYTES # BLD AUTO: 0.01 X10(3) UL (ref 0–1)
IMM GRANULOCYTES NFR BLD: 0.1 %
KETONES UR STRIP.AUTO-MCNC: NEGATIVE MG/DL
LDLC SERPL CALC-MCNC: 115 MG/DL (ref ?–100)
LEUKOCYTE ESTERASE UR QL STRIP.AUTO: NEGATIVE
LYMPHOCYTES # BLD AUTO: 3.98 X10(3) UL (ref 1–4)
LYMPHOCYTES NFR BLD AUTO: 52.8 %
MCH RBC QN AUTO: 31.2 PG (ref 26–34)
MCHC RBC AUTO-ENTMCNC: 33.1 G/DL (ref 31–37)
MCV RBC AUTO: 94.3 FL (ref 80–100)
MONOCYTES # BLD AUTO: 0.52 X10(3) UL (ref 0.1–1)
MONOCYTES NFR BLD AUTO: 6.9 %
NEUTROPHILS # BLD AUTO: 2.94 X10 (3) UL (ref 1.5–7.7)
NEUTROPHILS # BLD AUTO: 2.94 X10(3) UL (ref 1.5–7.7)
NEUTROPHILS NFR BLD AUTO: 39 %
NITRITE UR QL STRIP.AUTO: NEGATIVE
NONHDLC SERPL-MCNC: 133 MG/DL (ref ?–130)
OSMOLALITY SERPL CALC.SUM OF ELEC: 288 MOSM/KG (ref 275–295)
PH UR STRIP.AUTO: 6.5 [PH] (ref 5–8)
PLATELET # BLD AUTO: 223 10(3)UL (ref 150–450)
POTASSIUM SERPL-SCNC: 3.4 MMOL/L (ref 3.5–5.1)
PROT SERPL-MCNC: 7.4 G/DL (ref 5.7–8.2)
PROT UR STRIP.AUTO-MCNC: NEGATIVE MG/DL
RBC # BLD AUTO: 4.36 X10(6)UL (ref 3.8–5.3)
RBC UR QL AUTO: NEGATIVE
SODIUM SERPL-SCNC: 140 MMOL/L (ref 136–145)
SP GR UR STRIP.AUTO: 1 (ref 1–1.03)
TRIGL SERPL-MCNC: 100 MG/DL (ref 30–149)
TSI SER-ACNC: 2.07 UIU/ML (ref 0.55–4.78)
UROBILINOGEN UR STRIP.AUTO-MCNC: NORMAL MG/DL
VLDLC SERPL CALC-MCNC: 17 MG/DL (ref 0–30)
WBC # BLD AUTO: 7.5 X10(3) UL (ref 4–11)

## 2025-04-14 PROCEDURE — 36415 COLL VENOUS BLD VENIPUNCTURE: CPT

## 2025-04-14 PROCEDURE — 81003 URINALYSIS AUTO W/O SCOPE: CPT

## 2025-04-14 PROCEDURE — 80061 LIPID PANEL: CPT

## 2025-04-14 PROCEDURE — 85025 COMPLETE CBC W/AUTO DIFF WBC: CPT

## 2025-04-14 PROCEDURE — 83036 HEMOGLOBIN GLYCOSYLATED A1C: CPT

## 2025-04-14 PROCEDURE — 80053 COMPREHEN METABOLIC PANEL: CPT

## 2025-04-14 PROCEDURE — 84443 ASSAY THYROID STIM HORMONE: CPT

## 2025-04-14 PROCEDURE — 87517 HEPATITIS B DNA QUANT: CPT

## 2025-04-21 ENCOUNTER — OFFICE VISIT (OUTPATIENT)
Dept: FAMILY MEDICINE CLINIC | Facility: CLINIC | Age: 35
End: 2025-04-21
Payer: COMMERCIAL

## 2025-04-21 VITALS
SYSTOLIC BLOOD PRESSURE: 100 MMHG | HEIGHT: 65.5 IN | WEIGHT: 125.31 LBS | BODY MASS INDEX: 20.63 KG/M2 | DIASTOLIC BLOOD PRESSURE: 68 MMHG | HEART RATE: 74 BPM | TEMPERATURE: 97 F

## 2025-04-21 DIAGNOSIS — R05.2 SUBACUTE COUGH: Primary | ICD-10-CM

## 2025-04-21 DIAGNOSIS — B18.1 CHRONIC HEPATITIS B WITHOUT DELTA AGENT WITHOUT HEPATIC COMA (HCC): ICD-10-CM

## 2025-04-21 DIAGNOSIS — Z00.00 LABORATORY EXAM ORDERED AS PART OF ROUTINE GENERAL MEDICAL EXAMINATION: ICD-10-CM

## 2025-04-21 DIAGNOSIS — E78.00 ELEVATED LOW DENSITY LIPOPROTEIN (LDL) CHOLESTEROL LEVEL: ICD-10-CM

## 2025-04-21 LAB
HBV DNA DETECT, QN LOG: 2.27 {LOG_IU}/ML
HBV DNA DETECT, QN: 186 [IU]/ML

## 2025-04-21 RX ORDER — CETIRIZINE HYDROCHLORIDE 10 MG/1
10 TABLET ORAL DAILY
Qty: 90 TABLET | Refills: 0 | Status: SHIPPED | OUTPATIENT
Start: 2025-04-21

## 2025-04-21 RX ORDER — FLUTICASONE PROPIONATE 50 MCG
2 SPRAY, SUSPENSION (ML) NASAL DAILY
Qty: 1 EACH | Refills: 0 | Status: SHIPPED | OUTPATIENT
Start: 2025-04-21

## 2025-04-21 RX ORDER — ALBUTEROL SULFATE 90 UG/1
2 INHALANT RESPIRATORY (INHALATION)
COMMUNITY
Start: 2025-04-11 | End: 2025-05-11

## 2025-04-21 NOTE — PROGRESS NOTES
[facility] Family Medicine Office Note  Chief Complaint:   Chief Complaint   Patient presents with    Physical       HPI:   This is a 35 year old female coming in for annual physical.  Had yearly labs performed on the 14th.  Slightly elevated LDL.  States she was told previously she was prediabetic but A1c was 5.3.      Also has chronic hepatitis B.  Awaiting hepatitis B DNA results.  Has an appointment with hepatology in June.  Denies any increased abdominal pain.  Denies jaundice.    Complains of cough for 1 month.  States had a similar episode last year and was given antihistamines which improved symptoms.  Patient [place] but is uncertain if she has seasonal allergies.  Denies headaches fevers chest pain congestion.        Past Medical History[1]  Past Surgical History[2]  Social History:  Short Social Hx on File[3]  Family History:  Family History[4]  Allergies:  Allergies[5]  Current Meds:  Current Medications[6]   Counseling given: Not Answered       REVIEW OF SYSTEMS:   ROS:  CONSTITUTIONAL:  Denies any unusual weight gain/loss, fever, chills, weakness or fatigue.  HEENT:  Eyes:  Denies visual loss, blurred vision, double vision or yellow sclerae. Ears, Nose, Throat:  Denies hearing loss, sneezing, congestion, runny nose or sore throat.  SKIN:  No rash or itching.  CARDIOVASCULAR:  Denies chest pain, chest pressure or chest discomfort. No palpitations or edema.  Denies any dyspnea on exertion or at rest  RESPIRATORY:  Denies shortness of breath. C/o intermittent cough x 1 month  NEUROLOGICAL:  Denies headache, dizziness, syncope, numbness or tingling in the extremities.  MUSCULOSKELETAL:  Denies back pain, joint pain or stiffness. C/o muscle tightness/pain to right hamstring.   HEMATOLOGIC:  Denies anemia, bleeding or bruising.  LYMPHATICS:  Denies enlarged nodes.  PSYCHIATRIC:  Denies history of depression or anxiety.  ENDOCRINOLOGIC:  Denies reports of sweating, cold  or heat intolerance. Denies polyuria or polydipsia.  ALLERGIES:  Unknown if has history of asthma, hives, eczema or rhinitis.    EXAM:   /68   Pulse 74   Temp 97 °F (36.1 °C) (Temporal)   Ht 5' 5.5\" (1.664 m)   Wt 125 lb 4.8 oz (56.8 kg)   LMP  (LMP Unknown)   BMI 20.53 kg/m²  Estimated body mass index is 20.53 kg/m² as calculated from the following:    Height as of this encounter: 5' 5.5\" (1.664 m).    Weight as of this encounter: 125 lb 4.8 oz (56.8 kg).   Vital signs reviewed.Appears stated age, well groomed.  Physical Exam:  GEN:  Patient is alert and oriented x3, no apparent distress  HEAD:  Normocephalic, atraumatic  HEENT:  Eyes: EOMI, PERRLA, no scleral icterus, conjunctivae clear bilaterally.  Ears: TM's clear and visible bilaterally, no excess cerumen or erythema.  Throat:  No tonsillar exudate. Erythema noted Mouth:  No oral lesions or ulcerations, no dental abnormalities noted.  LUNGS: clear to auscultation bilaterally, no rales/rhonchi/wheezing  HEART:  Regular rate and rhythm, no murmurs, rubs or gallops  BREAST: No lumps, discoloration, or nipple discharge noted.  ABDOMEN:  Soft, nondistended, nontender, bowel sounds normal in all 4 quadrants, no hepatosplenomegal  EXTREMITIES:  Strength intact with 5/5 bilaterally upper and lower extremities, no edema noted. C/o tightness to right hamstring.  NEURO:  CN 2 - 12 grossly intact     ASSESSMENT AND PLAN:   1. Subacute cough  Discussed possible seasonal allergies as dicussed previous improved with antihistamine. Try flonase and zyrtec daily. Notify us if not improving.   - fluticasone propionate 50 MCG/ACT Nasal Suspension; 2 sprays by Each Nare route daily.  Dispense: 1 each; Refill: 0  - cetirizine (ZYRTEC ALLERGY) 10 MG Oral Tab; Take 1 tablet (10 mg total) by mouth daily.  Dispense: 90 tablet; Refill: 0    2. Laboratory exam ordered as part of routine general medical examination  Repeat fasting labs in 6 months.   - Lipid Panel [E];  Future  - Comp Metabolic Panel (14) [E]; Future  - Hemoglobin A1C [E]; Future  - Hemoglobin A1C [E]; Future    3. Chronic hepatitis B without delta agent without hepatic coma (HCC)   Will follow-up with hep DNA quantitative results. Follow-up with hepatology as planned.    4. Elevated low density lipoprotein (LDL) cholesterol level  Discussed elevated LDL.  Discussed eating healthy diet low in saturated fat.  Discussed that this may have a genetic component as well.  Will repeat lipids in 6 months.    Meds & Refills for this Visit:  Requested Prescriptions      No prescriptions requested or ordered in this encounter       Health Maintenance:  Health Maintenance Due   Topic Date Due    Pneumococcal Vaccine: Birth to 50yrs (1 of 2 - PCV) Never done    COVID-19 Vaccine (3 - 2024-25 season) 2024    Annual Physical  03/15/2025       Patient/Caregiver Education: Patient/Caregiver Education: There are no barriers to learning. Medical education done.   Outcome: Patient verbalizes understanding. Patient is notified to call with any questions, complications, allergies, or worsening or changing symptoms.  Patient is to call with any side effects or complications from the treatments as a result of today.     Problem List:  Problem List[7]    SALLY Dunn    Please note that portions of this note may have been completed with a voice recognition program. Efforts were made to edit the dictations but occasionally words are mis-transcribed.         [1]   Past Medical History:   Gestational diabetes (HCC)    Hepatitis B   [2]   Past Surgical History:  Procedure Laterality Date         [3]   Social History  Socioeconomic History    Marital status:    Tobacco Use    Smoking status: Never     Passive exposure: Never    Smokeless tobacco: Never   Vaping Use    Vaping status: Never Used   Substance and Sexual Activity    Alcohol use: Never    Drug use: Never    Sexual activity: Not Currently     Partners: Male    Other Topics Concern    Caffeine Concern Yes     Comment: 2-3 c. coffee/week    Stress Concern No    Weight Concern No    Special Diet No    Exercise Yes     Comment: walking    Seat Belt Yes     Social Drivers of Health     Food Insecurity: No Food Insecurity (2025)    NCSS - Food Insecurity     Worried About Running Out of Food in the Last Year: No     Ran Out of Food in the Last Year: No   Transportation Needs: No Transportation Needs (2025)    NCSS - Transportation     Lack of Transportation: No   Stress: No Stress Concern Present (3/4/2023)    Stress     Feeling of Stress : No   Housing Stability: Not At Risk (2025)    NCSS - Housing/Utilities     Has Housing: Yes     Worried About Losing Housing: No     Unable to Get Utilities: No   [4]   Family History  Problem Relation Age of Onset    Diabetes Mother     Hypertension Father     Other (Hepatitis B) Father     Diabetes Maternal Grandmother     Cancer Maternal Grandfather    [5]   Allergies  Allergen Reactions    Adhesive Tape RASH   [6]   Current Outpatient Medications   Medication Sig Dispense Refill    albuterol 108 (90 Base) MCG/ACT Inhalation Aero Soln Inhale 2 puffs into the lungs.     [7]   Patient Active Problem List  Diagnosis    Hepatitis B affecting pregnancy (HCC)    History of  delivery    Chronic hepatitis B without delta agent without hepatic coma (HCC)    Hyperprolactinemia (HCC)    History of oligohydramnios    Postpartum care and examination of lactating mother (HCC)    Lumbar degenerative disc disease    Facet arthritis, degenerative, lumbar spine

## 2025-07-21 ENCOUNTER — LAB ENCOUNTER (OUTPATIENT)
Dept: LAB | Age: 35
End: 2025-07-21
Payer: COMMERCIAL

## 2025-07-21 ENCOUNTER — HOSPITAL ENCOUNTER (OUTPATIENT)
Dept: ULTRASOUND IMAGING | Age: 35
Discharge: HOME | End: 2025-07-21
Attending: INTERNAL MEDICINE
Payer: COMMERCIAL

## 2025-07-21 DIAGNOSIS — B18.1 CHRONIC HEPATITIS B (HCC): ICD-10-CM

## 2025-07-21 PROCEDURE — 76705 ECHO EXAM OF ABDOMEN: CPT | Performed by: INTERNAL MEDICINE

## 2025-08-04 ENCOUNTER — OFFICE VISIT (OUTPATIENT)
Dept: SURGERY | Facility: CLINIC | Age: 35
End: 2025-08-04

## 2025-08-04 DIAGNOSIS — B18.1 HEPATITIS B CARRIER (HCC): Primary | ICD-10-CM

## (undated) NOTE — LETTER
[facility], [place]rel Khal[place], [address]  [place] [id number]  [facility]: [phone]  FAX: [phone]            2023      Jovanni Moraes ,  89 receives medical care for Gestational diabetes from the [place] [id number]. She is required at all times to have access to:    Blood Glucose Meter  Blood Glucose Test Strips  Lancets  Snacks      Please call [phone] if I can be of any further assistance.      Sincerely,        Dr. Era Brandt